# Patient Record
Sex: FEMALE | Race: WHITE | NOT HISPANIC OR LATINO | Employment: STUDENT | URBAN - METROPOLITAN AREA
[De-identification: names, ages, dates, MRNs, and addresses within clinical notes are randomized per-mention and may not be internally consistent; named-entity substitution may affect disease eponyms.]

---

## 2017-02-01 ENCOUNTER — TELEPHONE (OUTPATIENT)
Dept: NEUROLOGY | Age: 22
End: 2017-02-01

## 2017-02-01 RX ORDER — TOPIRAMATE 25 MG/1
25 TABLET ORAL NIGHTLY
Status: SHIPPED | COMMUNITY
Start: 2017-02-01 | End: 2017-06-02 | Stop reason: SDUPTHER

## 2017-02-02 ENCOUNTER — OFF PREMISE (OUTPATIENT)
Dept: OTHER | Age: 22
End: 2017-02-02

## 2017-02-03 RX ORDER — MECLIZINE HCL 12.5 MG/1
12.5 TABLET ORAL 3 TIMES DAILY PRN
Status: SHIPPED | COMMUNITY
Start: 2017-02-03 | End: 2017-06-02 | Stop reason: CLARIF

## 2017-06-02 ENCOUNTER — TRANSFERRED RECORDS (OUTPATIENT)
Dept: HEALTH INFORMATION MANAGEMENT | Facility: CLINIC | Age: 22
End: 2017-06-02

## 2017-06-02 ENCOUNTER — OFFICE VISIT (OUTPATIENT)
Dept: NEUROLOGY | Age: 22
End: 2017-06-02

## 2017-06-02 VITALS — SYSTOLIC BLOOD PRESSURE: 98 MMHG | DIASTOLIC BLOOD PRESSURE: 70 MMHG | HEART RATE: 80 BPM | WEIGHT: 122 LBS

## 2017-06-02 DIAGNOSIS — G43.109 MIGRAINE WITH AURA AND WITHOUT STATUS MIGRAINOSUS, NOT INTRACTABLE: Primary | ICD-10-CM

## 2017-06-02 PROCEDURE — 99213 OFFICE O/P EST LOW 20 MIN: CPT | Performed by: PSYCHIATRY & NEUROLOGY

## 2017-06-02 RX ORDER — TOPIRAMATE 25 MG/1
25 TABLET ORAL NIGHTLY
Qty: 30 TABLET | Refills: 11 | Status: SHIPPED | OUTPATIENT
Start: 2017-06-02 | End: 2018-06-17 | Stop reason: SDUPTHER

## 2017-07-12 ENCOUNTER — OFFICE VISIT (OUTPATIENT)
Dept: PEDIATRICS | Age: 22
End: 2017-07-12

## 2017-07-12 ENCOUNTER — TRANSFERRED RECORDS (OUTPATIENT)
Dept: HEALTH INFORMATION MANAGEMENT | Facility: CLINIC | Age: 22
End: 2017-07-12

## 2017-07-12 ENCOUNTER — LAB SERVICES (OUTPATIENT)
Dept: LAB | Age: 22
End: 2017-07-12

## 2017-07-12 VITALS — WEIGHT: 123 LBS

## 2017-07-12 DIAGNOSIS — J01.90 ACUTE SINUSITIS, UNSPECIFIED: Primary | ICD-10-CM

## 2017-07-12 DIAGNOSIS — Z13.220 LIPID SCREENING: ICD-10-CM

## 2017-07-12 PROCEDURE — 99213 OFFICE O/P EST LOW 20 MIN: CPT | Performed by: PEDIATRICS

## 2017-07-12 PROCEDURE — 80061 LIPID PANEL: CPT | Performed by: INTERNAL MEDICINE

## 2017-07-12 PROCEDURE — 36415 COLL VENOUS BLD VENIPUNCTURE: CPT | Performed by: INTERNAL MEDICINE

## 2017-07-12 RX ORDER — AMOXICILLIN AND CLAVULANATE POTASSIUM 875; 125 MG/1; MG/1
1 TABLET, FILM COATED ORAL EVERY 12 HOURS
Qty: 20 TABLET | Refills: 0 | Status: SHIPPED | OUTPATIENT
Start: 2017-07-12 | End: 2017-07-22

## 2017-07-13 LAB
CHOLEST SERPL-MCNC: 231 MG/DL
CHOLEST/HDLC SERPL: 4.4 {RATIO}
HDLC SERPL-MCNC: 52 MG/DL
LDLC SERPL-MCNC: 137 MG/DL
LENGTH OF FAST TIME PATIENT: 12 HRS
NONHDLC SERPL-MCNC: 179 MG/DL
TRIGL SERPL-MCNC: 208 MG/DL

## 2017-07-17 ENCOUNTER — TELEPHONE (OUTPATIENT)
Dept: PEDIATRICS | Age: 22
End: 2017-07-17

## 2017-08-11 ENCOUNTER — OFFICE VISIT (OUTPATIENT)
Dept: URGENT CARE | Facility: URGENT CARE | Age: 22
End: 2017-08-11
Payer: COMMERCIAL

## 2017-08-11 VITALS
DIASTOLIC BLOOD PRESSURE: 72 MMHG | SYSTOLIC BLOOD PRESSURE: 108 MMHG | HEART RATE: 90 BPM | WEIGHT: 129 LBS | OXYGEN SATURATION: 96 % | TEMPERATURE: 98.7 F

## 2017-08-11 DIAGNOSIS — L03.011 CELLULITIS OF RIGHT INDEX FINGER: Primary | ICD-10-CM

## 2017-08-11 PROCEDURE — 99203 OFFICE O/P NEW LOW 30 MIN: CPT | Performed by: INTERNAL MEDICINE

## 2017-08-11 ASSESSMENT — ENCOUNTER SYMPTOMS
SENSORY CHANGE: 0
FEVER: 0
CHILLS: 0

## 2017-08-11 NOTE — PATIENT INSTRUCTIONS
Call doctor if your finger swelling, redness, pain persist/worsens, or if you develop new symptoms or side effects from the medication.

## 2017-08-11 NOTE — MR AVS SNAPSHOT
"              After Visit Summary   8/11/2017    Carito Varela    MRN: [id number]           Patient Information     Date Of Birth          1995        Visit Information        Provider Department      8/11/2017 5:00 PM Moi Koroma MD [facility]        Today's Diagnoses     Cellulitis of right index finger    -  1      Care Instructions    Call doctor if your finger swelling, redness, pain persist/worsens, or if you develop new symptoms or side effects from the medication.            Follow-ups after your visit        Who to contact     If you have questions or need follow up information about today's clinic visit or your schedule please contact [facility] directly at [phone].  Normal or non-critical lab and imaging results will be communicated to you by MyChart, letter or phone within 4 business days after the clinic has received the results. If you do not hear from us within 7 days, please contact the clinic through MyChart or phone. If you have a critical or abnormal lab result, we will notify you by phone as soon as possible.  Submit refill requests through [employer] or call your pharmacy and they will forward the refill request to us. Please allow 3 business days for your refill to be completed.          Additional Information About Your Visit        MyChart Information     [employer] lets you send messages to your doctor, view your test results, renew your prescriptions, schedule appointments and more. To sign up, go to [email or web address] . Click on \"Log in\" on the left side of the screen, which will take you to the Welcome page. Then click on \"Sign up Now\" on the right side of the page.     You will be asked to enter the access code listed below, as well as some personal information. Please follow the directions to create your username and password.     Your access code is: [email or web address]  Expires: 11/9/2017  5:27 PM     Your access " code will  in 90 days. If you need help or a new code, please call your [place] clinic or [phone].        Care EveryWhere ID     This is your Care EveryWhere ID. This could be used by other organizations to access your [place] medical records  [id number]        Your Vitals Were     Pulse Temperature Pulse Oximetry             90 98.7  F (37.1  C) (Oral) 96%          Blood Pressure from Last 3 Encounters:   17 108/72    Weight from Last 3 Encounters:   17 129 lb (58.5 kg)              Today, you had the following     No orders found for display         Today's Medication Changes          These changes are accurate as of: 17  5:27 PM.  If you have any questions, ask your nurse or doctor.               Start taking these medicines.        Dose/Directions    amoxicillin-clavulanate 875-125 MG per tablet   Commonly known as:  AUGMENTIN   Used for:  Cellulitis of right index finger   Started by:  Moi Koroma MD        Dose:  1 tablet   Take 1 tablet by mouth 2 times daily   Quantity:  20 tablet   Refills:  0            Where to get your medicines      These medications were sent to [facility], [place] - [address]  [address], [address], [facility] [address]     Phone:  [phone]     amoxicillin-clavulanate 875-125 MG per tablet                Primary Care Provider    None Specified       No primary provider on file.        Equal Access to Services     [facility] : Hadii za aliceao Sotanika, waaxda luqadaha, qaybta kaalmada adeblakeda, danette daniel . So [facility] [phone].    ATENCIÓN: Si habla español, tiene a cardona disposición servicios gratuitos de asistencia lingüística. Dave al [phone].    We comply with applicable federal civil rights laws and [place] laws. We do not discriminate on the basis of race, color, national origin, age, disability sex, sexual orientation or gender  identity.            Thank you!     Thank you for choosing [facility]  for your care. Our goal is always to provide you with excellent care. Hearing back from our patients is one way we can continue to improve our services. Please take a few minutes to complete the written survey that you may receive in the mail after your visit with us. Thank you!             Your Updated Medication List - Protect others around you: Learn how to safely use, store and throw away your medicines at www.disposemymeds.org.          This list is accurate as of: 8/11/17  5:27 PM.  Always use your most recent med list.                   Brand Name Dispense Instructions for use Diagnosis    amoxicillin-clavulanate 875-125 MG per tablet    AUGMENTIN    20 tablet    Take 1 tablet by mouth 2 times daily    Cellulitis of right index finger       norethindrone-ethinyl estradiol 0.5/0.75/1-35 MG-MCG per tablet    ORTHO-NOVUM 7/7/7     Take 1 tablet by mouth daily        SPIRONOLACTONE PO           TOPAMAX PO

## 2017-08-11 NOTE — NURSING NOTE
Chief Complaint   Patient presents with     Urgent Care     Finger     right index finger infected? Vomiting nausea on wednesday and thursday       Initial /72  Pulse 90  Temp 98.7  F (37.1  C) (Oral)  Wt 129 lb (58.5 kg)  SpO2 96% There is no height or weight on file to calculate BMI.  Medication Reconciliation: complete    Tracey MAYS MA

## 2017-08-12 NOTE — PROGRESS NOTES
HPI Comments:   Problem: derm problem    Character - tender erythematous swelling  Location - tip of right index finger  Intensity - pain tolerable  Timing/Onset - 1 week  Aggravating Factors - pain aggravated by palpation/pressure  Alleviating Factors - no OTC remedies tried  Associated Symptoms - no fever/chills      Past medical history: no history of recurrent or resistant skin infections      Review of Systems   Constitutional: Negative for chills and fever.   Neurological: Negative for sensory change.       /72  Pulse 90  Temp 98.7  F (37.1  C) (Oral)  Wt 129 lb (58.5 kg)  SpO2 96%      Physical Exam   Constitutional: She is oriented to person, place, and time. No distress.   Musculoskeletal: Normal range of motion. She exhibits tenderness ( tip of right index finger). She exhibits no edema.   Neurological: She is alert and oriented to person, place, and time. GCS score is 15.   Skin: There is erythema ( tip of right index finger).   No abscess   Vitals reviewed.        ICD-10-CM    1. Cellulitis of right index finger L03.011 amoxicillin-clavulanate (AUGMENTIN) 875-125 MG per tablet     **please refer to HPI for status of conditions      Patient Instructions   Call doctor if your finger swelling, redness, pain persist/worsens, or if you develop new symptoms or side effects from the medication.

## 2017-09-07 ENCOUNTER — OFFICE VISIT (OUTPATIENT)
Dept: URGENT CARE | Facility: URGENT CARE | Age: 22
End: 2017-09-07
Payer: COMMERCIAL

## 2017-09-07 VITALS
TEMPERATURE: 99 F | DIASTOLIC BLOOD PRESSURE: 72 MMHG | SYSTOLIC BLOOD PRESSURE: 116 MMHG | HEART RATE: 70 BPM | OXYGEN SATURATION: 100 %

## 2017-09-07 DIAGNOSIS — L73.9 FOLLICULITIS: Primary | ICD-10-CM

## 2017-09-07 PROCEDURE — 99213 OFFICE O/P EST LOW 20 MIN: CPT | Performed by: INTERNAL MEDICINE

## 2017-09-07 RX ORDER — CEPHALEXIN 500 MG/1
500 CAPSULE ORAL 3 TIMES DAILY
Qty: 30 CAPSULE | Refills: 0 | Status: SHIPPED | OUTPATIENT
Start: 2017-09-07 | End: 2018-07-27

## 2017-09-07 RX ORDER — MUPIROCIN 20 MG/G
OINTMENT TOPICAL 3 TIMES DAILY
Qty: 22 G | Refills: 1 | Status: SHIPPED | OUTPATIENT
Start: 2017-09-07 | End: 2017-09-12

## 2017-09-07 NOTE — MR AVS SNAPSHOT
"              After Visit Summary   2017    Carito Varela    MRN: [id number]           Patient Information     Date Of Birth          1995        Visit Information        Provider Department      2017 6:50 PM Susanne Jimenez MD [facility]        Today's Diagnoses     Folliculitis    -  1       Follow-ups after your visit        Who to contact     If you have questions or need follow up information about today's clinic visit or your schedule please contact [facility] directly at [phone].  Normal or non-critical lab and imaging results will be communicated to you by [employer], letter or phone within 4 business days after the clinic has received the results. If you do not hear from us within 7 days, please contact the clinic through [employer] or phone. If you have a critical or abnormal lab result, we will notify you by phone as soon as possible.  Submit refill requests through [employer] or call your pharmacy and they will forward the refill request to us. Please allow 3 business days for your refill to be completed.          Additional Information About Your Visit        MyChart Information     [employer] lets you send messages to your doctor, view your test results, renew your prescriptions, schedule appointments and more. To sign up, go to [email or web address] . Click on \"Log in\" on the left side of the screen, which will take you to the Welcome page. Then click on \"Sign up Now\" on the right side of the page.     You will be asked to enter the access code listed below, as well as some personal information. Please follow the directions to create your username and password.     Your access code is: [email or web address]  Expires: 2017  5:27 PM     Your access code will  in 90 days. If you need help or a new code, please call your [facility] or [phone].        Care EveryWhere ID     This is your Care EveryWhere ID. This could be used by other " organizations to access your [place] medical records  [id number]        Your Vitals Were     Pulse Temperature Pulse Oximetry Breastfeeding?          70 99  F (37.2  C) (Oral) 100% No         Blood Pressure from Last 3 Encounters:   09/07/17 116/72   08/11/17 108/72    Weight from Last 3 Encounters:   08/11/17 129 lb (58.5 kg)              Today, you had the following     No orders found for display         Today's Medication Changes          These changes are accurate as of: 9/7/17  9:00 PM.  If you have any questions, ask your nurse or doctor.               Start taking these medicines.        Dose/Directions    cephALEXin 500 MG capsule   Commonly known as:  KEFLEX   Used for:  Folliculitis        Dose:  500 mg   Take 1 capsule (500 mg) by mouth 3 times daily   Quantity:  30 capsule   Refills:  0       mupirocin 2 % ointment   Commonly known as:  BACTROBAN   Used for:  Folliculitis        Apply topically 3 times daily for 5 days   Quantity:  22 g   Refills:  1            Where to get your medicines      These medications were sent to [facility] [address] [place] [address] AT [address] [address]    Hours:  24-hours Phone:  [phone]     cephALEXin 500 MG capsule    mupirocin 2 % ointment                Primary Care Provider    None Specified       No primary provider on file.        Equal Access to Services     DARRELL KNIGHT : Laury haas Sotanika, waaxda luqadaha, qaybta kaalmada adeegyada, danette chaves. So [facility] [phone].    ATENCIÓN: Si habla español, tiene a cardona disposición servicios gratuitos de asistencia lingüística. Llame al [phone].    We comply with applicable federal civil rights laws and [place] laws. We do not discriminate on the basis of race, color, national origin, age, disability sex, sexual orientation or gender identity.            Thank you!     Thank you for choosing [facility]  for your care. Our goal is always to provide you with excellent care. Hearing back from our patients is one way we can continue to improve our services. Please take a few minutes to complete the written survey that you may receive in the mail after your visit with us. Thank you!             Your Updated Medication List - Protect others around you: Learn how to safely use, store and throw away your medicines at www.disposemymeds.org.          This list is accurate as of: 9/7/17  9:00 PM.  Always use your most recent med list.                   Brand Name Dispense Instructions for use Diagnosis    amoxicillin-clavulanate 875-125 MG per tablet    AUGMENTIN    20 tablet    Take 1 tablet by mouth 2 times daily    Cellulitis of right index finger       cephALEXin 500 MG capsule    KEFLEX    30 capsule    Take 1 capsule (500 mg) by mouth 3 times daily    Folliculitis       mupirocin 2 % ointment    BACTROBAN    22 g    Apply topically 3 times daily for 5 days    Folliculitis       norethindrone-ethinyl estradiol 0.5/0.75/1-35 MG-MCG per tablet    ORTHO-NOVUM 7/7/7     Take 1 tablet by mouth daily        SPIRONOLACTONE PO           TOPAMAX PO

## 2017-09-08 NOTE — PROGRESS NOTES
[facility] Progress Note        Susanne Jimenez MD, MPH  09/07/2017    Carito Varela is a pleasant 22 year old female seen for a non-pruritic rash involving forehead x 2 weeks.  There has not been any change in the diet or new detergent, soap or toiletries.  No new medications, no insect bite.  No fever or chills and no recent illness.  No cough or shortness of breath or wheezing is referred.  No nausea, vomiting or diarrhea.  No arthralgia or myalgia.  No tongue or lip swelling or swallowing problems.    Physical Exam   /72 (BP Location: Right arm, Cuff Size: Adult Regular)  Pulse 70  Temp 99  F (37.2  C) (Oral)  SpO2 100%  Breastfeeding? No     Constitutional: Patient is in no distress The patient is pleasant and cooperative.   HEENT: Head:  Head is atraumatic, normocephalic.    Eyes: Pupils are equal, round and reactive to light and accomodation.  Sclera is non-icteric. No conjunctival injection, or exudate noted. Extraocular motion is intact. Visual acuity is intact bilaterally.  Ears:  External acoustic canals are patent and clear.  There is no erythema and bulging( exudate)  of the ( R/L ) tympanic membrane(s ).   Nose:  No Nasal congestion w/o drainage or mucosal ulceration is noted.  Throat:  Oral mucosa is moist.  No oral lesions are noted.  No posterior pharyngeal hyperemia or exudate noted.     Neck Supple.  There is no cervical lymphadenopathy.  No nuchal rigidity noted.  There is no meningismus.     Cardiovascular: Heart is regular to rate and rhythm.  No murmur is noted.     Chest. Chest Symmetrical, no soft tissues, swelling, or tenderness upon palpation   Lungs: Clear in the anterior and posterior pulmonary fields.   Abdomen: Soft and non-tender.    Back No flank tenderness is noted.   Extremeties No edema, no calf tenderness.   Neuro: No focal deficit.   Skin No petechiae or purpura is noted.  There is are perifollicular papular rash w/o vesicles or ulceration of the  forehead. No cellulitis is noted. No crusty lesions.   Mood Normal         Assessment & Plan     Folliculitis ( forehead);    - mupirocin (BACTROBAN) 2 % ointment; Apply topically 3 times daily for 5 days  Dispense: 22 g; Refill: 1  - cephALEXin (KEFLEX) 500 MG capsule; Take 1 capsule (500 mg) by mouth 3 times daily  Dispense: 30 capsule; Refill: 0   advised to wash the skin of forehead w an antibacterial soap and water daily.  Follow-up with your PCP in 3-4 days days, earlier if symptoms worsen.

## 2017-09-08 NOTE — NURSING NOTE
Chief Complaint   Patient presents with     Urgent Care     Derm Problem     Rash on forehead that started two weeks ago.        Initial /72 (BP Location: Right arm, Cuff Size: Adult Regular)  Pulse 70  Temp 99  F (37.2  C) (Oral)  SpO2 100%  Breastfeeding? No There is no height or weight on file to calculate BMI.  Medication Reconciliation: complete.  ANGÉLICA Blackmon

## 2018-06-21 RX ORDER — TOPIRAMATE 25 MG/1
TABLET ORAL
Qty: 30 TABLET | Refills: 1 | Status: SHIPPED | OUTPATIENT
Start: 2018-06-21

## 2018-06-22 ENCOUNTER — TELEPHONE (OUTPATIENT)
Dept: NEUROLOGY | Age: 23
End: 2018-06-22

## 2018-07-24 NOTE — TELEPHONE ENCOUNTER
FUTURE VISIT INFORMATION      FUTURE VISIT INFORMATION:    Date: 07/27/2018    Time:     Location:   REFERRAL INFORMATION:    Referring provider:  System, Provider Not In Dr. Mcclendon     Referring providers clinic:      Reason for visit/diagnosis      RECORDS REQUESTED FROM:       Clinic name Comments Records Status Imaging Status   [facility] MRI BRAIN WO CONTRAST 12/30/2014  Requested on 07/23/2018  In-coming  In-Coming                                    RECORDS STATUS      Internal Records [facility], Care Everywhere.

## 2018-07-27 ENCOUNTER — PRE VISIT (OUTPATIENT)
Dept: NEUROLOGY | Facility: CLINIC | Age: 23
End: 2018-07-27

## 2018-07-27 ENCOUNTER — OFFICE VISIT (OUTPATIENT)
Dept: NEUROLOGY | Facility: CLINIC | Age: 23
End: 2018-07-27
Payer: COMMERCIAL

## 2018-07-27 VITALS
WEIGHT: 128.7 LBS | SYSTOLIC BLOOD PRESSURE: 104 MMHG | DIASTOLIC BLOOD PRESSURE: 66 MMHG | BODY MASS INDEX: 20.68 KG/M2 | HEART RATE: 75 BPM | HEIGHT: 66 IN

## 2018-07-27 DIAGNOSIS — G43.009 MIGRAINE WITHOUT AURA AND WITHOUT STATUS MIGRAINOSUS, NOT INTRACTABLE: Primary | ICD-10-CM

## 2018-07-27 RX ORDER — TOPIRAMATE 25 MG/1
25 TABLET, FILM COATED ORAL 2 TIMES DAILY
Qty: 60 TABLET | Refills: 6 | Status: SHIPPED | OUTPATIENT
Start: 2018-07-27 | End: 2018-10-09

## 2018-07-27 ASSESSMENT — PAIN SCALES - GENERAL: PAINLEVEL: NO PAIN (0)

## 2018-07-27 NOTE — PROGRESS NOTES
"Service Date: 07/27/2018      REASON FOR VISIT:   Carito Varela is a 23-year-old female here to establish care for migraine headaches.      HISTORY OF PRESENT ILLNESS:   She has had migraines since childhood.  Her typical headache is left-sided, generally behind the left eye.  With the more severe headaches, she can have nausea and vomiting.  She is photophobic.  She may get tunnel vision during the headaches but otherwise has had no other complex neurologic symptoms.  She does not really recall an aura to her headaches.      She did have [facility] that was reported as \"unremarkable brain.\"      In the past, she was tried on nortriptyline, but developed diaphoresis and was switched to Topamax.  On a dose of 50 mg a day, it brought her headaches under very good control.  About a year after she started Topamax she had a few weeks of dizziness.  She is unclear if it really related to the Topamax or not, but at that point, her dose was reduced to 25 mg a day.      She reports mild headaches 3 times a week that are similar to her migraines, but less severe.  She tries to take nothing for those.  She can have a severe headache 2-4 times a month and she will take Excedrin for those with good relief.      She has not experienced any side effects from the Topamax.  She is on an oral contraceptive and is not planning pregnancy.  She has no history of kidney stones.  She has her eyes checked periodically and has not been told she had glaucoma.      Her past history is otherwise notable, by her report, for high cholesterol for which she is not on any specific treatment.      CURRENT MEDICATIONS:   1.  Topamax 25 mg a day.     2.  Spironolactone for skin.     3.  Oral contraceptive.     4.  Coenzyme Q.      ALLERGIES:  She is allergic to Zofran and Macrobid.      FAMILY HISTORY:  Strongly positive for migraine including her mother, sister, paternal aunt, and both her " grandmothers.      SOCIAL HISTORY:  She works for an [employer] agency.  She does not smoke.  She is a social user of alcohol.      PHYSICAL EXAMINATION:     GENERAL:   Reveals a thin female.  She is alert and cooperative.     VITAL SIGNS:   Heart rate 75.  Blood pressure 104/66.     NEUROLOGIC:   Funduscopic examination reveals sharp disc margins.  Pupils are equal, round and react well to light.  Visual fields are intact.  Cranial nerves II-XII are intact.  Motor, sensory, cerebellar and gait testing are normal.  Reflexes are 2+.  Plantar responses are flexor.      IMPRESSION:  Migraine without aura.      PLAN:  We discussed increasing her Topamax dose back up to 50 mg a day.  It is unclear if the dizziness she experienced on this dose was really related to the Topamax and she would like to try this.      We discussed the potential side effects of Topamax including pregnancy issue, kidney stones and glaucoma.  I also discussed weight loss which she tells me has not been an issue for her in the past.      She can continue to use Excedrin for abortive therapy as she is using it infrequently.      She will contact me if she has any difficulty with the increased dose of Topamax.  Otherwise, I will plan to see her back in 6 months.      MD AGAPITO Wallace MD             D: 2018   T: 2018   MT: KAYLA      Name:     JOHN ARIZMENDI   MRN:      [id number]        Account:      [id number]   :      1995           Service Date: 2018      Document: [id number]

## 2018-07-27 NOTE — MR AVS SNAPSHOT
"              After Visit Summary   7/27/2018    Carito Varela    MRN: [id number]           Patient Information     Date Of Birth          1995        Visit Information        Provider Department      7/27/2018 7:00 AM Avery Sterling MD [facility] Neurology        Today's Diagnoses     Migraine without aura and without status migrainosus, not intractable    -  1       Follow-ups after your visit        Follow-up notes from your care team     Discussed this visit Return in about 6 months (around 1/27/2019).      Who to contact     Please call your clinic at [phone] to:    Ask questions about your health    Make or cancel appointments    Discuss your medicines    Learn about your test results    Speak to your doctor            Additional Information About Your Visit        Access [employer] Information     [employer] gives you secure access to your electronic health record. If you see a primary care provider, you can also send messages to your care team and make appointments. If you have questions, please call your primary care clinic.  If you do not have a primary care provider, please call [phone] and they will assist you.      [employer] is an electronic gateway that provides easy, online access to your medical records. With [employer], you can request a clinic appointment, read your test results, renew a prescription or communicate with your care team.     To access your existing account, please contact your [facility] or call [phone] for assistance.        Care EveryWhere ID     This is your Care EveryWhere ID. This could be used by other organizations to access your [facility] medical records  [id number]        Your Vitals Were     Pulse Height BMI (Body Mass Index)             75 1.676 m (5' 6\") 20.77 kg/m2          Blood Pressure from Last 3 Encounters:   07/27/18 104/66   09/07/17 116/72   08/11/17 108/72    Weight from Last 3 Encounters:   07/27/18 58.4 kg (128 lb 11.2 oz) "   08/11/17 58.5 kg (129 lb)              Today, you had the following     No orders found for display         Today's Medication Changes          These changes are accurate as of 7/27/18  7:15 AM.  If you have any questions, ask your nurse or doctor.               These medicines have changed or have updated prescriptions.        Dose/Directions    * TOPAMAX PO   This may have changed:  Another medication with the same name was added. Make sure you understand how and when to take each.   Changed by:  Avery Sterling MD        Refills:  0       * topiramate 25 MG tablet   Commonly known as:  TOPAMAX   This may have changed:  You were already taking a medication with the same name, and this prescription was added. Make sure you understand how and when to take each.   Used for:  Migraine without aura and without status migrainosus, not intractable   Changed by:  Avery Sterling MD        Dose:  25 mg   Take 1 tablet (25 mg) by mouth 2 times daily   Quantity:  60 tablet   Refills:  6       * Notice:  This list has 2 medication(s) that are the same as other medications prescribed for you. Read the directions carefully, and ask your doctor or other care provider to review them with you.         Where to get your medicines      These medications were sent to [facility] [address] [place] [address] AT [address] [address]    Hours:  24-hours Phone:  [phone]     topiramate 25 MG tablet                Primary Care Provider Office Phone # Fax #    Sara Pittman Mibe [phone]       [facility] [address] [address] [facility] [address]        Equal Access to Services     MILO KNIGHT AH: Hadsavage haas Sotanika, waaxda luqadaha, qaybta kaalmada aderadha, danette chaves. So [facility] [phone].    ATENCIÓN: Si habla español, tiene a cardona disposición servicios gratuitos de asistencia lingüística. Llame al  [phone].    We comply with applicable federal civil rights laws and [place] laws. We do not discriminate on the basis of race, color, national origin, age, disability, sex, sexual orientation, or gender identity.            Thank you!     Thank you for choosing [facility] NEUROLOGY  for your care. Our goal is always to provide you with excellent care. Hearing back from our patients is one way we can continue to improve our services. Please take a few minutes to complete the written survey that you may receive in the mail after your visit with us. Thank you!             Your Updated Medication List - Protect others around you: Learn how to safely use, store and throw away your medicines at www.disposemymeds.org.          This list is accurate as of 7/27/18  7:15 AM.  Always use your most recent med list.                   Brand Name Dispense Instructions for use Diagnosis    COENZYME Q-10 PO      Take 300 mg by mouth daily        norethindrone-ethinyl estradiol 0.5/0.75/1-35 MG-MCG per tablet    ORTHO-NOVUM 7/7/7     Take 1 tablet by mouth daily        SPIRONOLACTONE PO           * TOPAMAX PO           * topiramate 25 MG tablet    TOPAMAX    60 tablet    Take 1 tablet (25 mg) by mouth 2 times daily    Migraine without aura and without status migrainosus, not intractable       * Notice:  This list has 2 medication(s) that are the same as other medications prescribed for you. Read the directions carefully, and ask your doctor or other care provider to review them with you.

## 2018-07-27 NOTE — LETTER
"7/27/2018       RE: Carito Varela  [address]  [facility] [address]     Dear Colleague,    Thank you for referring your patient, Carito Varela, to the [facility] NEUROLOGY at [facility]. Please see a copy of my visit note below.    Service Date: 07/27/2018      REASON FOR VISIT:   Carito Varela is a 23-year-old female here to establish care for migraine headaches.      HISTORY OF PRESENT ILLNESS:   She has had migraines since childhood.  Her typical headache is left-sided, generally behind the left eye.  With the more severe headaches, she can have nausea and vomiting.  She is photophobic.  She may get tunnel vision during the headaches but otherwise has had no other complex neurologic symptoms.  She does not really recall an aura to her headaches.      She did have a brain MRI scan done through the [facility] on 12/30/2014 that was reported as \"unremarkable brain.\"      In the past, she was tried on nortriptyline, but developed diaphoresis and was switched to Topamax.  On a dose of 50 mg a day, it brought her headaches under very good control.  About a year after she started Topamax she had a few weeks of dizziness.  She is unclear if it really related to the Topamax or not, but at that point, her dose was reduced to 25 mg a day.      She reports mild headaches 3 times a week that are similar to her migraines, but less severe.  She tries to take nothing for those.  She can have a severe headache 2-4 times a month and she will take Excedrin for those with good relief.      She has not experienced any side effects from the Topamax.  She is on an oral contraceptive and is not planning pregnancy.  She has no history of kidney stones.  She has her eyes checked periodically and has not been told she had glaucoma.      Her past history is otherwise notable, by her report, for high cholesterol for which she is not on any specific treatment.      CURRENT " MEDICATIONS:   1.  Topamax 25 mg a day.     2.  Spironolactone for skin.     3.  Oral contraceptive.     4.  Coenzyme Q.      ALLERGIES:  She is allergic to Zofran and Macrobid.      FAMILY HISTORY:  Strongly positive for migraine including her mother, sister, paternal aunt, and both her grandmothers.      SOCIAL HISTORY:  She works for an [employer] agency.  She does not smoke.  She is a social user of alcohol.      PHYSICAL EXAMINATION:     GENERAL:   Reveals a thin female.  She is alert and cooperative.     VITAL SIGNS:   Heart rate 75.  Blood pressure 104/66.     NEUROLOGIC:   Funduscopic examination reveals sharp disc margins.  Pupils are equal, round and react well to light.  Visual fields are intact.  Cranial nerves II-XII are intact.  Motor, sensory, cerebellar and gait testing are normal.  Reflexes are 2+.  Plantar responses are flexor.      IMPRESSION:  Migraine without aura.      PLAN:  We discussed increasing her Topamax dose back up to 50 mg a day.  It is unclear if the dizziness she experienced on this dose was really related to the Topamax and she would like to try this.      We discussed the potential side effects of Topamax including pregnancy issue, kidney stones and glaucoma.  I also discussed weight loss which she tells me has not been an issue for her in the past.      She can continue to use Excedrin for abortive therapy as she is using it infrequently.      She will contact me if she has any difficulty with the increased dose of Topamax.  Otherwise, I will plan to see her back in 6 months.      Avery Sterling MD              D: 2018   T: 2018   MT: KAYLA      Name:     JOHN ARIZMENDI   MRN:      [id number]        Account:      [id number]   :      1995           Service Date: 2018      Document: [id number]

## 2018-07-28 ENCOUNTER — HEALTH MAINTENANCE LETTER (OUTPATIENT)
Age: 23
End: 2018-07-28

## 2018-10-09 DIAGNOSIS — G43.009 MIGRAINE WITHOUT AURA AND WITHOUT STATUS MIGRAINOSUS, NOT INTRACTABLE: ICD-10-CM

## 2018-10-10 RX ORDER — TOPIRAMATE 25 MG/1
25 TABLET, FILM COATED ORAL 2 TIMES DAILY
Qty: 180 TABLET | Refills: 3 | Status: SHIPPED | OUTPATIENT
Start: 2018-10-10 | End: 2019-01-16

## 2019-01-01 ENCOUNTER — EXTERNAL RECORD (OUTPATIENT)
Dept: OTHER | Age: 24
End: 2019-01-01

## 2019-01-16 ENCOUNTER — OFFICE VISIT (OUTPATIENT)
Dept: NEUROLOGY | Facility: CLINIC | Age: 24
End: 2019-01-16
Payer: COMMERCIAL

## 2019-01-16 VITALS
HEART RATE: 89 BPM | BODY MASS INDEX: 19.93 KG/M2 | OXYGEN SATURATION: 100 % | WEIGHT: 123.5 LBS | SYSTOLIC BLOOD PRESSURE: 113 MMHG | DIASTOLIC BLOOD PRESSURE: 71 MMHG

## 2019-01-16 DIAGNOSIS — G43.009 MIGRAINE WITHOUT AURA AND WITHOUT STATUS MIGRAINOSUS, NOT INTRACTABLE: ICD-10-CM

## 2019-01-16 DIAGNOSIS — G43.709 CHRONIC MIGRAINE WITHOUT AURA WITHOUT STATUS MIGRAINOSUS, NOT INTRACTABLE: Primary | ICD-10-CM

## 2019-01-16 PROCEDURE — 99213 OFFICE O/P EST LOW 20 MIN: CPT | Performed by: PSYCHIATRY & NEUROLOGY

## 2019-01-16 RX ORDER — CHOLECALCIFEROL (VITAMIN D3) 50 MCG
1 TABLET ORAL DAILY
COMMUNITY

## 2019-01-16 RX ORDER — TOPIRAMATE 25 MG/1
TABLET, FILM COATED ORAL
Qty: 180 TABLET | Refills: 3 | Status: SHIPPED | OUTPATIENT
Start: 2019-01-16 | End: 2019-03-22

## 2019-01-16 ASSESSMENT — PAIN SCALES - GENERAL: PAINLEVEL: NO PAIN (0)

## 2019-01-16 NOTE — NURSING NOTE
Carito Varela's goals for this visit include: return  She requests these members of her care team be copied on today's visit information:     PCP: Sara Wallace    Referring Provider:  Sara Wallace MD  [facility]  [address] [place] [address]    /71   Pulse 89   Wt 56 kg (123 lb 8 oz)   SpO2 100%   BMI 19.93 kg/m      Do you need any medication refills at today's visit? y

## 2019-01-16 NOTE — LETTER
1/16/2019         RE: Carito Varela  [address] [facility] [address]  [place] [address]        Dear Colleague,    Thank you for referring your patient, Carito Varela, to the [facility]. Please see a copy of my visit note below.    Visit Date:   01/16/2019      HISTORY OF PRESENT ILLNESS:  Carito Varela returns for followup of chronic migraine.  She has had migraines since her childhood.      She had an unremarkable brain MRI scan done in 12/2014.      When I saw her in July, it was decided to try and increase her Topamax to 50 mg a day.  She did not experience any side effects on the higher dose, but her dizziness did not get any worse.  She does report that occasionally her menstrual period will come a bit earlier, but she is not having any significant breakthrough bleeding throughout her cycle.  Overall, she has been tolerating this higher dose of Topamax.  For a while, she did quite well with the reduction in her headache frequency, but then in November she began experiencing daily left-sided headaches typical of her migraine, also some twitching of her left eyelid.  She cannot identify any specific factors that may have provoked the increase in her migraine frequency, but they did melinda last week.      CURRENT MEDICATIONS:  Excedrin, Ortho-Novum, Topamax 25 mg twice a day, Vitamin D, and spironolactone.      She did not tolerate nortriptyline due to diaphoresis.      PHYSICAL EXAMINATION:  Reveals her heart rate is 89.  Blood pressure 113/71.  Weight of 123.5 pounds.      Pupils are equal, round, and react well to light.  Funduscopic examination reveals sharp disc margins.  She has full extraocular motility.  There are no abnormal facial movements appreciated.  She has normal facial strength and sensation.  Otherwise, cranial nerves II-XII are intact.  Motor, sensory, cerebellar, and gait testing are normal.  Reflexes are 2-3+ and symmetric.  Plantar responses are flexor.      ASSESSMENT:   Migraine.      PLAN:  She is going to push her Topamax dose up to 75 mg.  Depending on her response and side effects, she has the latitude to go to 100 mg a day in divided doses after a week.      I am going to see her back in February.  Depending on how she does with Topamax, I may have to consider other treatment options, and venlafaxine might be considered at that time.         AVERY STERLING MD             D: 2019   T: 2019   MT: BRIGID      Name:     JOHN ARIZMENDI   MRN:      [id number]        Account:      [id number]   :      1995           Visit Date:   2019      Document: [id number]        Again, thank you for allowing me to participate in the care of your patient.        Sincerely,        Avery Sterling MD

## 2019-01-17 NOTE — PROGRESS NOTES
Visit Date:   01/16/2019      HISTORY OF PRESENT ILLNESS:  Carito Varela returns for followup of chronic migraine.  She has had migraines since her childhood.      She had an unremarkable brain MRI scan done in 12/2014.      When I saw her in July, it was decided to try and increase her Topamax to 50 mg a day.  She did not experience any side effects on the higher dose, but her dizziness did not get any worse.  She does report that occasionally her menstrual period will come a bit earlier, but she is not having any significant breakthrough bleeding throughout her cycle.  Overall, she has been tolerating this higher dose of Topamax.  For a while, she did quite well with the reduction in her headache frequency, but then in November she began experiencing daily left-sided headaches typical of her migraine, also some twitching of her left eyelid.  She cannot identify any specific factors that may have provoked the increase in her migraine frequency, but they did melinda last week.      CURRENT MEDICATIONS:  Excedrin, Ortho-Novum, Topamax 25 mg twice a day, Vitamin D, and spironolactone.      She did not tolerate nortriptyline due to diaphoresis.      PHYSICAL EXAMINATION:  Reveals her heart rate is 89.  Blood pressure 113/71.  Weight of 123.5 pounds.      Pupils are equal, round, and react well to light.  Funduscopic examination reveals sharp disc margins.  She has full extraocular motility.  There are no abnormal facial movements appreciated.  She has normal facial strength and sensation.  Otherwise, cranial nerves II-XII are intact.  Motor, sensory, cerebellar, and gait testing are normal.  Reflexes are 2-3+ and symmetric.  Plantar responses are flexor.      ASSESSMENT:  Migraine.      PLAN:  She is going to push her Topamax dose up to 75 mg.  Depending on her response and side effects, she has the latitude to go to 100 mg a day in divided doses after a week.      I am going to see her back in February.  Depending on how  she does with Topamax, I may have to consider other treatment options, and venlafaxine might be considered at that time.         AGAPITO LEE MD             D: 2019   T: 2019   MT: BRIGID      Name:     JOHN ARIZMENDI   MRN:      [id number]        Account:      [id number]   :      1995           Visit Date:   2019      Document: [id number]

## 2019-02-22 ENCOUNTER — OFFICE VISIT (OUTPATIENT)
Dept: NEUROLOGY | Facility: CLINIC | Age: 24
End: 2019-02-22
Payer: COMMERCIAL

## 2019-02-22 VITALS — SYSTOLIC BLOOD PRESSURE: 115 MMHG | DIASTOLIC BLOOD PRESSURE: 70 MMHG | OXYGEN SATURATION: 99 % | HEART RATE: 82 BPM

## 2019-02-22 DIAGNOSIS — G43.709 CHRONIC MIGRAINE WITHOUT AURA WITHOUT STATUS MIGRAINOSUS, NOT INTRACTABLE: Primary | ICD-10-CM

## 2019-02-22 DIAGNOSIS — R42 DIZZINESS: ICD-10-CM

## 2019-02-22 RX ORDER — VENLAFAXINE HYDROCHLORIDE 37.5 MG/1
37.5 CAPSULE, EXTENDED RELEASE ORAL DAILY
Qty: 90 CAPSULE | Refills: 3 | Status: SHIPPED | OUTPATIENT
Start: 2019-02-22 | End: 2019-06-28

## 2019-02-22 ASSESSMENT — PAIN SCALES - GENERAL: PAINLEVEL: NO PAIN (0)

## 2019-02-22 NOTE — NURSING NOTE
Chief Complaint   Patient presents with     RECHECK     [facility] RETURN 5 WK FOLLOW UP       Seda Ness, EMT

## 2019-02-22 NOTE — PROGRESS NOTES
Service Date: 2019      John Arizmendi returns for followup of chronic migraine.  I saw her last month.  Her headaches had increased in frequency.  She did increase her Topamax dose to 75 mg.  Her migraine frequency definitely improved to having just 1 headache every other week.  Unfortunately, she developed a number of side effects on the higher dose including increase in her dizziness, nausea, foggy headedness and tingling.      We discussed backing off and possibly eliminating the Topamax depending on how she does.   She is going to reduce the dose to 50 mg but has the latitude to go to 25 mg for a week and then stop the drug if she is still having side effects.      Today I did discuss trying venlafaxine now.  We reviewed  potential side effects.  I am going to go with a low dose of 37.5 mg a day.      I will see her back in a month.         AGAPITO LEE MD             D: 2019   T: 2019   MT: jose      Name:     JOHN ARIZMENDI   MRN:      [id number]        Account:      [id number]   :      1995           Service Date: 2019      Document: [id number]

## 2019-02-22 NOTE — LETTER
RE: John Arizmendi  [address]  [place] [address]     Dear Colleague,    Thank you for referring your patient, John Arizmendi, to the [facility] NEUROLOGY at [facility]. Please see a copy of my visit note below.    Service Date: 2019      John Arizmendi returns for followup of chronic migraine.  I saw her last month.  Her headaches had increased in frequency.  She did increase her Topamax dose to 75 mg.  Her migraine frequency definitely improved to having just 1 headache every other week.  Unfortunately, she developed a number of side effects on the higher dose including increase in her dizziness, nausea, foggy headedness and tingling.      We discussed backing off and possibly eliminating the Topamax depending on how she does.   She is going to reduce the dose to 50 mg but has the latitude to go to 25 mg for a week and then stop the drug if she is still having side effects.      Today I did discuss trying venlafaxine now.  We reviewed  potential side effects.  I am going to go with a low dose of 37.5 mg a day.      I will see her back in a month.         AGAPITO LEE MD        D: 2019   T: 2019   MT: jose    Name:     JOHN ARIZMENDI   MRN:      [id number]        Account:      [id number]   :      1995           Service Date: 2019    Document: [id number]    
05-Jun-2010 11:10:13

## 2019-02-22 NOTE — PATIENT INSTRUCTIONS
Reduce topamax to 50 mg a day for a week. If still dizzy, nausea, etc decrease to 25 mg for a week. If still side effects can then stop topamax    Start venlafaxine 37.5 mg a day

## 2019-03-22 ENCOUNTER — OFFICE VISIT (OUTPATIENT)
Dept: NEUROLOGY | Facility: CLINIC | Age: 24
End: 2019-03-22
Payer: COMMERCIAL

## 2019-03-22 VITALS
DIASTOLIC BLOOD PRESSURE: 86 MMHG | TEMPERATURE: 98.2 F | HEART RATE: 95 BPM | WEIGHT: 125.7 LBS | OXYGEN SATURATION: 99 % | SYSTOLIC BLOOD PRESSURE: 125 MMHG | BODY MASS INDEX: 20.2 KG/M2 | RESPIRATION RATE: 18 BRPM | HEIGHT: 66 IN

## 2019-03-22 DIAGNOSIS — G43.009 MIGRAINE WITHOUT AURA AND WITHOUT STATUS MIGRAINOSUS, NOT INTRACTABLE: Primary | ICD-10-CM

## 2019-03-22 RX ORDER — TOPIRAMATE 25 MG/1
TABLET, FILM COATED ORAL
Qty: 180 TABLET | Refills: 3 | Status: SHIPPED | OUTPATIENT
Start: 2019-03-22 | End: 2020-03-29

## 2019-03-22 ASSESSMENT — MIFFLIN-ST. JEOR: SCORE: 1341.92

## 2019-03-22 ASSESSMENT — PAIN SCALES - GENERAL: PAINLEVEL: NO PAIN (0)

## 2019-03-22 NOTE — LETTER
3/22/2019       RE: John Arizmendi  [address] [facility] [address]  [place] [address]     Dear Colleague,    Thank you for referring your patient, John Arizmendi, to the [facility] NEUROLOGY at [facility]. Please see a copy of my visit note below.    Service Date: 2019      INTERVAL HISTORY:  John Arizmendi returns for followup of migraine.      Last month I saw her.  Her headaches had increased in frequency.  She had increased her Topamax dose to 75 mg and it definitely helped her headaches, but she developed a number of side effects including nausea, foggy headedness and tingling.      After the last visit, she did reduce her Topamax dose to 50 mg and started venlafaxine 37.5 mg.  She indicates the transition was a bit rough, but the last few weeks she has had no significant headaches and she seems to be tolerating the combination well aside from feeling a bit tired during the day.      I discussed tapering her off the Topamax completely and just using venlafaxine at this point, or continuing with the combination of the 2 drugs which currently has been very effective.  For now, she does not want to make any changes as she is doing so well, so she will continue on Topamax 50 mg and venlafaxine 37.5 mg.        I am going to be seeing her back in 3 months.  She will contact me sooner if she has any problems.                 AGAPITO LEE MD             D: 2019   T: 2019   MT: KAYLA      Name:     JOHN ARIZMENDI   MRN:      [id number]        Account:      [id number]   :      1995           Service Date: 2019      Document: [id number]

## 2019-03-22 NOTE — NURSING NOTE
Chief Complaint   Patient presents with     RECHECK     [facility] return patient visit for 1 month follow up        Lynsey Vicente MA

## 2019-06-28 ENCOUNTER — OFFICE VISIT (OUTPATIENT)
Dept: NEUROLOGY | Facility: CLINIC | Age: 24
End: 2019-06-28
Payer: COMMERCIAL

## 2019-06-28 VITALS — DIASTOLIC BLOOD PRESSURE: 78 MMHG | HEART RATE: 77 BPM | OXYGEN SATURATION: 97 % | SYSTOLIC BLOOD PRESSURE: 108 MMHG

## 2019-06-28 DIAGNOSIS — G43.019 INTRACTABLE MIGRAINE WITHOUT AURA AND WITHOUT STATUS MIGRAINOSUS: ICD-10-CM

## 2019-06-28 DIAGNOSIS — G43.019 INTRACTABLE MIGRAINE WITHOUT AURA AND WITHOUT STATUS MIGRAINOSUS: Primary | ICD-10-CM

## 2019-06-28 LAB
ANION GAP SERPL CALCULATED.3IONS-SCNC: 5 MMOL/L (ref 3–14)
BUN SERPL-MCNC: 13 MG/DL (ref 7–30)
CALCIUM SERPL-MCNC: 8.9 MG/DL (ref 8.5–10.1)
CHLORIDE SERPL-SCNC: 110 MMOL/L (ref 94–109)
CO2 SERPL-SCNC: 24 MMOL/L (ref 20–32)
CREAT SERPL-MCNC: 0.91 MG/DL (ref 0.52–1.04)
GFR SERPL CREATININE-BSD FRML MDRD: 89 ML/MIN/{1.73_M2}
GLUCOSE SERPL-MCNC: 76 MG/DL (ref 70–99)
POTASSIUM SERPL-SCNC: 4 MMOL/L (ref 3.4–5.3)
SODIUM SERPL-SCNC: 139 MMOL/L (ref 133–144)

## 2019-06-28 RX ORDER — GABAPENTIN 100 MG/1
100 CAPSULE ORAL 3 TIMES DAILY
Qty: 90 CAPSULE | Refills: 3 | Status: SHIPPED | OUTPATIENT
Start: 2019-06-28 | End: 2021-01-22

## 2019-06-28 ASSESSMENT — PAIN SCALES - GENERAL: PAINLEVEL: MILD PAIN (2)

## 2019-06-28 NOTE — NURSING NOTE
Chief Complaint   Patient presents with     RECHECK     [facility] RETURN 3 MO F/U FOR HEADACHES       Seda Ness, EMT

## 2019-06-28 NOTE — PROGRESS NOTES
Service Date: 06/28/2019      HISTORY OF PRESENT ILLNESS:  Carito Varela returns for followup of chronic migraine without aura.      When I saw her in March, she was actually doing very well with a combination of Topamax 50 mg and venlafaxine 37.5 mg.  Unfortunately, her headaches have gotten bad again.  She is having daily headaches and she started taking more Excedrin.  She indicates she feels tired on the venlafaxine.  She has not been able to tolerate doses of Topamax over 50 mg in the past and also had trouble tolerating nortriptyline.      She tells me in July, she is going to go on a 2-week trip to [place] and will be at a higher altitudes.      CURRENT MEDICATIONS:   1.  Excedrin   2.  Oral contraceptive.   3.  Spironolactone   4.  Topamax 50 mg.   5.  Venlafaxine 37.5 mg.      PHYSICAL EXAMINATION:  Examination reveals her heart rate is 77.  Blood pressure 108/78.  Funduscopic examination reveals sharp disc margins.  Pupils are equal, round and react well to light.  Visual fields are intact.  Cranial nerves II-XII are intact.  Motor, sensory, cerebellar and gait testing are normal.  Reflexes are 2+.  Plantar responses are flexor.      IMPRESSION:  Chronic migraine.      PLAN:  She had questions about medications more specific for migraine and I believe she was referring to calcitonin gene receptor antagonists.  We discussed these.  I also discussed Botox with her.  I think she would be a candidate for one or the other.  She would be interested in seeing one of the specialists in the [facility] and I am making a referral.      She is going to stop the venlafaxine as she believes it is making her feel very fatigued and currently it is not helping her headaches any longer.      She is going to continue on Topamax 50 mg, which has been helpful in the past.  She cannot tolerate higher doses.      I am going to check a basic metabolic panel today and I will communicate those results to her via [employer]. (BMP OK)      We did discuss her planned trip to [place].  She is aware that there may be a problem at higher altitudes in terms of precipitating or worsening her headaches.  She is going to be meeting with a travel doctor before leaving.        Finally, I discussed adding a low dose of gabapentin to see if this will help with her current daily headaches.  I reviewed potential side effects.  She will start on 100 mg 3 times a day.  If she develops any symptoms such as fatigue or drowsiness during the daytime, she can take the full dose at night.  I also cautioned her about depression on the drug, and if that were to develop, she should stop the gabapentin immediately.      ADDENDUM:  Total visit time 25 minutes.  More than 50% of this time was spent in counseling and coordination of care.         AGAPITO LEE MD             D: 2019   T: 2019   MT: jose      Name:     JOHN ARIZMENDI   MRN:      [id number]        Account:      [id number]   :      1995           Service Date: 2019      Document: [id number]

## 2019-06-28 NOTE — LETTER
6/28/2019       RE: Carito Varela  [address] [facility] [address]  [place] [address]     Dear Colleague,    Thank you for referring your patient, Carito Varela, to the [facility] NEUROLOGY at [facility]. Please see a copy of my visit note below.    Service Date: 06/28/2019      HISTORY OF PRESENT ILLNESS:  Carito Varela returns for followup of chronic migraine without aura.      When I saw her in March, she was actually doing very well with a combination of Topamax 50 mg and venlafaxine 37.5 mg.  Unfortunately, her headaches have gotten bad again.  She is having daily headaches and she started taking more Excedrin.  She indicates she feels tired on the venlafaxine.  She has not been able to tolerate doses of Topamax over 50 mg in the past and also had trouble tolerating nortriptyline.      She tells me in July, she is going to go on a 2-week trip to [place] and will be at a higher altitudes.      CURRENT MEDICATIONS:   1.  Excedrin   2.  Oral contraceptive.   3.  Spironolactone   4.  Topamax 50 mg.   5.  Venlafaxine 37.5 mg.      PHYSICAL EXAMINATION:  Examination reveals her heart rate is 77.  Blood pressure 108/78.  Funduscopic examination reveals sharp disc margins.  Pupils are equal, round and react well to light.  Visual fields are intact.  Cranial nerves II-XII are intact.  Motor, sensory, cerebellar and gait testing are normal.  Reflexes are 2+.  Plantar responses are flexor.      IMPRESSION:  Chronic migraine.      PLAN:  She had questions about medications more specific for migraine and I believe she was referring to calcitonin gene receptor antagonists.  We discussed these.  I also discussed Botox with her.  I think she would be a candidate for one or the other.  She would be interested in seeing one of the specialists in the [facility] and I am making a referral.      She is going to stop the venlafaxine as she believes it is making her feel very fatigued and  currently it is not helping her headaches any longer.      She is going to continue on Topamax 50 mg, which has been helpful in the past.  She cannot tolerate higher doses.      I am going to check a basic metabolic panel today and I will communicate those results to her via [facility]. (BMP OK)     We did discuss her planned trip to [place].  She is aware that there may be a problem at higher altitudes in terms of precipitating or worsening her headaches.  She is going to be meeting with a travel doctor before leaving.        Finally, I discussed adding a low dose of gabapentin to see if this will help with her current daily headaches.  I reviewed potential side effects.  She will start on 100 mg 3 times a day.  If she develops any symptoms such as fatigue or drowsiness during the daytime, she can take the full dose at night.  I also cautioned her about depression on the drug, and if that were to develop, she should stop the gabapentin immediately.      ADDENDUM:  Total visit time 25 minutes.  More than 50% of this time was spent in counseling and coordination of care.         AGAPITO LEE MD             D: 2019   T: 2019   MT: jose      Name:     JOHN ARIZMENDI   MRN:      [id number]        Account:      [id number]   :      1995           Service Date: 2019      Document: [id number]

## 2019-07-05 ENCOUNTER — TELEPHONE (OUTPATIENT)
Dept: NEUROLOGY | Age: 24
End: 2019-07-05

## 2019-07-26 ENCOUNTER — PRE VISIT (OUTPATIENT)
Dept: NEUROLOGY | Facility: CLINIC | Age: 24
End: 2019-07-26

## 2019-07-26 NOTE — TELEPHONE ENCOUNTER
FUTURE VISIT INFORMATION      FUTURE VISIT INFORMATION:    Date: 8/14/2019    Time: 7AM    [place] [facility] [place] INFORMATION:    Referring provider:  Dr. Sterling    Referring providers clinic:  [facility]     Reason for visit/diagnosis  Migraines    RECORDS REQUESTED FROM:       Clinic name Comments Records Status Imaging Status   [facility] Everyhere N/A   [facility] Everywhere N/A

## 2019-08-14 ENCOUNTER — OFFICE VISIT (OUTPATIENT)
Dept: NEUROLOGY | Facility: CLINIC | Age: 24
End: 2019-08-14
Attending: PSYCHIATRY & NEUROLOGY
Payer: COMMERCIAL

## 2019-08-14 VITALS
WEIGHT: 124 LBS | BODY MASS INDEX: 20.01 KG/M2 | SYSTOLIC BLOOD PRESSURE: 113 MMHG | OXYGEN SATURATION: 100 % | HEART RATE: 71 BPM | DIASTOLIC BLOOD PRESSURE: 74 MMHG

## 2019-08-14 DIAGNOSIS — G43.709 CHRONIC MIGRAINE WITHOUT AURA WITHOUT STATUS MIGRAINOSUS, NOT INTRACTABLE: Primary | ICD-10-CM

## 2019-08-14 RX ORDER — NARATRIPTAN 1 MG/1
1 TABLET ORAL
Qty: 18 TABLET | Refills: 3 | Status: SHIPPED | OUTPATIENT
Start: 2019-08-14 | End: 2021-01-22

## 2019-08-14 RX ORDER — VENLAFAXINE HYDROCHLORIDE 37.5 MG/1
37.5 CAPSULE, EXTENDED RELEASE ORAL DAILY
COMMUNITY
End: 2021-01-22

## 2019-08-14 ASSESSMENT — ENCOUNTER SYMPTOMS
BACK PAIN: 1
CONSTIPATION: 1
MYALGIAS: 1
BLOOD IN STOOL: 0
JOINT SWELLING: 0
ABDOMINAL PAIN: 1
WEAKNESS: 0
MUSCLE CRAMPS: 0
BOWEL INCONTINENCE: 0
ARTHRALGIAS: 0
TREMORS: 0
DEPRESSION: 0
NECK PAIN: 1
PARALYSIS: 0
MEMORY LOSS: 0
MUSCLE WEAKNESS: 0
DIZZINESS: 1
SEIZURES: 0
NERVOUS/ANXIOUS: 1
HEADACHES: 1
LOSS OF CONSCIOUSNESS: 0
TINGLING: 0
JAUNDICE: 0
DECREASED CONCENTRATION: 1
NUMBNESS: 0
DISTURBANCES IN COORDINATION: 0
VOMITING: 0
HEARTBURN: 0
BLOATING: 1
INSOMNIA: 1
DIARRHEA: 1
SPEECH CHANGE: 0
STIFFNESS: 0
PANIC: 0
RECTAL PAIN: 0
NAUSEA: 1

## 2019-08-14 ASSESSMENT — HEADACHE IMPACT TEST (HIT 6)
HIT6 TOTAL SCORE: 65
HOW OFTEN DO HEADACHES LIMIT YOUR DAILY ACTIVITIES: SOMETIMES
WHEN YOU HAVE HEADACHES HOW OFTEN IS THE PAIN SEVERE: VERY OFTEN
HOW OFTEN HAVE YOU FELT TOO TIRED TO WORK BECAUSE OF YOUR HEADACHES: VERY OFTEN
HOW OFTEN DID HEADACHS LIMIT CONCENTRATION ON WORK OR DAILY ACTIVITY: VERY OFTEN
WHEN YOU HAVE A HEADACHE HOW OFTEN DO YOU WISH YOU COULD LIE DOWN: VERY OFTEN
HIT6 TOTAL SCORE: 65
HOW OFTEN DID HEADACHS LIMIT CONCENTRATION ON WORK OR DAILY ACTIVITY: VERY OFTEN
HOW OFTEN HAVE YOU FELT FED UP OR IRRITATED BECAUSE OF YOUR HEADACHES: VERY OFTEN
HOW OFTEN HAVE YOU FELT TOO TIRED TO WORK BECAUSE OF YOUR HEADACHES: VERY OFTEN
HOW OFTEN HAVE YOU FELT FED UP OR IRRITATED BECAUSE OF YOUR HEADACHES: VERY OFTEN
WHEN YOU HAVE A HEADACHE HOW OFTEN DO YOU WISH YOU COULD LIE DOWN: VERY OFTEN
WHEN YOU HAVE HEADACHES HOW OFTEN IS THE PAIN SEVERE: VERY OFTEN
HOW OFTEN DO HEADACHES LIMIT YOUR DAILY ACTIVITIES: SOMETIMES

## 2019-08-14 ASSESSMENT — PAIN SCALES - GENERAL: PAINLEVEL: NO PAIN (0)

## 2019-08-14 NOTE — PROGRESS NOTES
[facility]  Neurology Consult     Carito Varela MRN# [id number]   YOB: 1995 Age: 24 year old     Requesting physician: Dr. Sterling         Assessment and Recommendations:   Carito Varela is a 24 year old female who presents to the neurology clinic for further evaluation of headache.    Her headache presentation is consistent with a long history of migraine, which she has on a genetic basis likely, given her family history with migraine on both sides.  This has been increased for some time, and she now has a 15-20 headache days a month, and meets criteria for chronic migraine without aura.  Her neurologic exam is intact today.  She has had previous head imaging that was unrevealing for secondary causes of headache.  I did not recommend any further work-up for headache today.    Moving forward, we discussed a symptomatic treatment strategy, focusing more on prevention.  Unfortunately, she has not had a good response with maximized doses of topiramate, venlafaxine, and gabapentin.  She is also tried nortriptyline in the past which caused side effects and was stopped.  She currently takes Spironolactone for other reasons, and this is not been helpful for headache.  She has a history of dizziness, and I did not recommend a beta-blocker out of fear of worsening this.  She also takes co-Q10 supplements, which are not clearly effective.   - I recommend a trial of botulinum toxin injections using a chronic migraine protocol.  We will attempt to get preauthorization, and we will plan to see her back for her first set of injections.  I recommend 3 sets of injections prior to determining effectiveness.  If botulinum toxin injections were helpful in reducing her headache burden, our plan would be for her to titrate off of topiramate and venlafaxine.  - If botulinum toxin injections were not tolerated or not effective after an adequate trial, a CGRP inhibitor  could be considered.  -We also discussed her acute treatment strategy of Excedrin up to twice a week.  I think she could continue this, and I offered a trial of a triptan in addition for her more severe headaches.  She is somewhat concerned about worsening dizziness with triptans, and we together decided to trial naratriptan 1 mg tablet the onset of headache, with a repeat dose in 4 hours if needed.  This should not exceed more than 9 days/month to avoid medication overuse.    I spent 40 minutes with the patient, over half of which was counseling.    Abril Salazar MD  Neurology  Pager: [phone]            Chief Complaint:   Chief Complaint   Patient presents with     Headache     [facility] NEW - INTRACTABLE MIGRAINE W/O AURA W/O STATUS MIGRAINOSUS           History is obtained from the patient and medical record.      Carito Varela is a 24 year old female who is been suffering from headaches since childhood.  Her headaches generally are left and retro-orbital, described as a stabbing or throbbing pain that feels like there are people poking her from behind her eye.  This pain is generally intense, rating a 6 or 7 out of 10, but can become even more severe to a 9 out of 10 and be associated with vomiting.  Her headaches are associated with photophobia, phonophobia, nausea, as well as vomiting.  They generally last 24 hours, but can last multiple days.  She denies any typical aura, positional component to her pain, autonomic features, or fevers or other illness associated with her headaches.  She does note a previous left zygomatic fracture and orbital floor fracture, but this occurred after the onset of headache, and did not affect the frequency or severity of her headache.  She is also noticed some left eye twitching at times with headache.     She also reports a history of slight scoliosis and temporomandibular joint dysfunction bilaterally.    She currently has 15-20 headache days a month, with at least 4 severe  headache days a month.  Her headaches had improved in the past, but then worsened again several months ago.  When the headache is present, she prefers to rub her left eye.  Triggers for headache include decreased sleep, eating poorly, or alcohol.    For treatment of headache, she currently takes Excedrin, and tries to limit this to 2 days a week, although she has more headache days than this.  She is never tried a triptan due to fear of worsening her baseline dizziness.    For prevention of headache, she currently takes topiramate 50 mg daily, and had side effects with higher doses, venlafaxine 37.5 mg daily, and co-Q10 daily.  She also takes spironolactone daily for skin.            Past Medical History:   She has a history of a left zygomatic fracture, left orbital floor fracture, TMJ, scoliosis, and hyperlipidemia          Past Surgical History:   She denies past surgical history.          Social History:   She is single and works in consulting, frequently using a computer.  She finds that her headaches make it difficult to look at the computer screen and make talking with people harder.  She denies smoking, drinks alcohol 1-3 times weekly, and denies drug use.  She drinks 1 cup of coffee and one Diet Coke daily.          Family History:   There is a family history of migraine in her mother, sister, grandmother, and paternal aunt.          Allergies:      Allergies   Allergen Reactions     Zofran [Ondansetron] Hives     Macrobid [Nitrofurantoin] Hives and Other (See Comments)     PN: joint pain             Medications:     Current Outpatient Medications:      aspirin-acetaminophen-caffeine (EXCEDRIN MIGRAINE) 250-250-65 MG tablet, Take 2 tablets by mouth every 6 hours as needed , Disp: , Rfl:      COENZYME Q-10 PO, Take 300 mg by mouth daily, Disp: , Rfl:      norethindrone-ethinyl estradiol (ORTHO-NOVUM 7/7/7) 0.5/0.75/1-35 MG-MCG per tablet, Take 1 tablet by mouth daily, Disp: , Rfl:      SPIRONOLACTONE PO,  Take 50 mg by mouth daily , Disp: , Rfl:      topiramate (TOPAMAX) 25 MG tablet, Two at night, Disp: 180 tablet, Rfl: 3     venlafaxine (EFFEXOR-XR) 37.5 MG 24 hr capsule, Take 37.5 mg by mouth daily, Disp: , Rfl:      vitamin D3 (CHOLECALCIFEROL) 2000 units tablet, Take 1 tablet by mouth daily, Disp: , Rfl:      gabapentin (NEURONTIN) 100 MG capsule, Take 1 capsule (100 mg) by mouth 3 times daily (Patient not taking: Reported on 8/14/2019), Disp: 90 capsule, Rfl: 3          Review of Systems:   Complete review of systems is negative, except for dizziness and as noted in the HPI.         Physical Exam:   /74 (BP Location: Left arm, Patient Position: Sitting, Cuff Size: Adult Regular)   Pulse 71   Wt 56.2 kg (124 lb)   SpO2 100%   BMI 20.01 kg/m     Physical Exam:   General: NAD  Neurologic:   Mental Status Exam: Alert, awake and oriented to situation. No dysarthria. Speech of normal fluency.   Cranial Nerves: Fundoscopic exam with clear disc margins bilaterally. PE (5 to 6 mm bilaterally) RRLA, EOMs intact, no nystagmus, facial movements symmetric, facial sensation intact to light touch, hearing intact to conversation, trapezius and SCMs 5/5 bilaterally, tongue midline and fully mobile. No tongue atrophy or fasciculations.    Motor: Normal tone in all four extremities, no atrophy or fasciculations. 5/5 strength bilaterally in shoulder abduction, elbow extensors and flexors, wrist extensors and flexors, hip flexors, knee extensors and flexors, dorsi- and plantarflexion. No tremors or abnormal movements noted.   Sensory: Sensation intact to light touch on arms and legs bilaterally.    Coordination: Finger-nose-finger intact bilaterally.  Rapidly alternating movements intact bilaterally in the upper extremities.  Normal finger tapping bilaterally.  Normal Romberg.   Reflexes: 2+ and symmetric in triceps, biceps, brachioradialis, patellar, Achilles, and plantars downgoing bilaterally.   Gait: Normal gait.   Able to toe and heel walk.  Tandem gait normal.  Head: Normocephalic, atraumatic. No radiating pain with palpation over the supraorbital notches, occipital nerves.  Temporal pulses intact.   Neck: Normal range of motion with lateral head movements and neck flexion.  Eyes: No conjunctival injection, no scleral icterus.   Respiratory: No increased work of breathing.  Cardiovascular: No lower extremity edema.  Extremities: Warm, dry.          Data:     MRI brain (December 30, 2014): Per report from [facility] in [facility], unremarkable

## 2019-08-14 NOTE — LETTER
8/14/2019      RE: Carito Varela  [address]  [place] [address]       [facility]  Neurology Consult     Carito Varela MRN# [id number]   YOB: 1995 Age: 24 year old     Requesting physician: Dr. Sterling         Assessment and Recommendations:   Carito Varela is a 24 year old female who presents to the neurology clinic for further evaluation of headache.    Her headache presentation is consistent with a long history of migraine, which she has on a genetic basis likely, given her family history with migraine on both sides.  This has been increased for some time, and she now has a 15-20 headache days a month, and meets criteria for chronic migraine without aura.  Her neurologic exam is intact today.  She has had previous head imaging that was unrevealing for secondary causes of headache.  I did not recommend any further work-up for headache today.    Moving forward, we discussed a symptomatic treatment strategy, focusing more on prevention.  Unfortunately, she has not had a good response with maximized doses of topiramate, venlafaxine, and gabapentin.  She is also tried nortriptyline in the past which caused side effects and was stopped.  She currently takes Spironolactone for other reasons, and this is not been helpful for headache.  She has a history of dizziness, and I did not recommend a beta-blocker out of fear of worsening this.  She also takes co-Q10 supplements, which are not clearly effective.   - I recommend a trial of botulinum toxin injections using a chronic migraine protocol.  We will attempt to get preauthorization, and we will plan to see her back for her first set of injections.  I recommend 3 sets of injections prior to determining effectiveness.  If botulinum toxin injections were helpful in reducing her headache burden, our plan would be for her to titrate off of topiramate and venlafaxine.  - If botulinum  toxin injections were not tolerated or not effective after an adequate trial, a CGRP inhibitor could be considered.  -We also discussed her acute treatment strategy of Excedrin up to twice a week.  I think she could continue this, and I offered a trial of a triptan in addition for her more severe headaches.  She is somewhat concerned about worsening dizziness with triptans, and we together decided to trial naratriptan 1 mg tablet the onset of headache, with a repeat dose in 4 hours if needed.  This should not exceed more than 9 days/month to avoid medication overuse.    I spent 40 minutes with the patient, over half of which was counseling.    Abril Salazar MD  Neurology  Pager: [phone]            Chief Complaint:   Chief Complaint   Patient presents with     Headache     [facility] NEW - INTRACTABLE MIGRAINE W/O AURA W/O STATUS MIGRAINOSUS           History is obtained from the patient and medical record.      Carito Varela is a 24 year old female who is been suffering from headaches since childhood.  Her headaches generally are left and retro-orbital, described as a stabbing or throbbing pain that feels like there are people poking her from behind her eye.  This pain is generally intense, rating a 6 or 7 out of 10, but can become even more severe to a 9 out of 10 and be associated with vomiting.  Her headaches are associated with photophobia, phonophobia, nausea, as well as vomiting.  They generally last 24 hours, but can last multiple days.  She denies any typical aura, positional component to her pain, autonomic features, or fevers or other illness associated with her headaches.  She does note a previous left zygomatic fracture and orbital floor fracture, but this occurred after the onset of headache, and did not affect the frequency or severity of her headache.  She is also noticed some left eye twitching at times with headache.     She also reports a history of slight scoliosis and temporomandibular joint  dysfunction bilaterally.    She currently has 15-20 headache days a month, with at least 4 severe headache days a month.  Her headaches had improved in the past, but then worsened again several months ago.  When the headache is present, she prefers to rub her left eye.  Triggers for headache include decreased sleep, eating poorly, or alcohol.    For treatment of headache, she currently takes Excedrin, and tries to limit this to 2 days a week, although she has more headache days than this.  She is never tried a triptan due to fear of worsening her baseline dizziness.    For prevention of headache, she currently takes topiramate 50 mg daily, and had side effects with higher doses, venlafaxine 37.5 mg daily, and co-Q10 daily.  She also takes spironolactone daily for skin.            Past Medical History:   She has a history of a left zygomatic fracture, left orbital floor fracture, TMJ, scoliosis, and hyperlipidemia          Past Surgical History:   She denies past surgical history.          Social History:   She is single and works in consulting, frequently using a computer.  She finds that her headaches make it difficult to look at the computer screen and make talking with people harder.  She denies smoking, drinks alcohol 1-3 times weekly, and denies drug use.  She drinks 1 cup of coffee and one Diet Coke daily.          Family History:   There is a family history of migraine in her mother, sister, grandmother, and paternal aunt.          Allergies:      Allergies   Allergen Reactions     Zofran [Ondansetron] Hives     Macrobid [Nitrofurantoin] Hives and Other (See Comments)     PN: joint pain             Medications:     Current Outpatient Medications:      aspirin-acetaminophen-caffeine (EXCEDRIN MIGRAINE) 250-250-65 MG tablet, Take 2 tablets by mouth every 6 hours as needed , Disp: , Rfl:      COENZYME Q-10 PO, Take 300 mg by mouth daily, Disp: , Rfl:      norethindrone-ethinyl estradiol (ORTHO-NOVUM 7/7/7)  0.5/0.75/1-35 MG-MCG per tablet, Take 1 tablet by mouth daily, Disp: , Rfl:      SPIRONOLACTONE PO, Take 50 mg by mouth daily , Disp: , Rfl:      topiramate (TOPAMAX) 25 MG tablet, Two at night, Disp: 180 tablet, Rfl: 3     venlafaxine (EFFEXOR-XR) 37.5 MG 24 hr capsule, Take 37.5 mg by mouth daily, Disp: , Rfl:      vitamin D3 (CHOLECALCIFEROL) 2000 units tablet, Take 1 tablet by mouth daily, Disp: , Rfl:      gabapentin (NEURONTIN) 100 MG capsule, Take 1 capsule (100 mg) by mouth 3 times daily (Patient not taking: Reported on 8/14/2019), Disp: 90 capsule, Rfl: 3          Review of Systems:   Complete review of systems is negative, except for dizziness and as noted in the HPI.         Physical Exam:   /74 (BP Location: Left arm, Patient Position: Sitting, Cuff Size: Adult Regular)   Pulse 71   Wt 56.2 kg (124 lb)   SpO2 100%   BMI 20.01 kg/m      Physical Exam:   General: NAD  Neurologic:   Mental Status Exam: Alert, awake and oriented to situation. No dysarthria. Speech of normal fluency.   Cranial Nerves: Fundoscopic exam with clear disc margins bilaterally. PE (5 to 6 mm bilaterally) RRLA, EOMs intact, no nystagmus, facial movements symmetric, facial sensation intact to light touch, hearing intact to conversation, trapezius and SCMs 5/5 bilaterally, tongue midline and fully mobile. No tongue atrophy or fasciculations.    Motor: Normal tone in all four extremities, no atrophy or fasciculations. 5/5 strength bilaterally in shoulder abduction, elbow extensors and flexors, wrist extensors and flexors, hip flexors, knee extensors and flexors, dorsi- and plantarflexion. No tremors or abnormal movements noted.   Sensory: Sensation intact to light touch on arms and legs bilaterally.    Coordination: Finger-nose-finger intact bilaterally.  Rapidly alternating movements intact bilaterally in the upper extremities.  Normal finger tapping bilaterally.  Normal Romberg.   Reflexes: 2+ and symmetric in triceps,  biceps, brachioradialis, patellar, Achilles, and plantars downgoing bilaterally.   Gait: Normal gait.  Able to toe and heel walk.  Tandem gait normal.  Head: Normocephalic, atraumatic. No radiating pain with palpation over the supraorbital notches, occipital nerves.  Temporal pulses intact.   Neck: Normal range of motion with lateral head movements and neck flexion.  Eyes: No conjunctival injection, no scleral icterus.   Respiratory: No increased work of breathing.  Cardiovascular: No lower extremity edema.  Extremities: Warm, dry.          Data:     MRI brain (December 30, 2014): Per report from [facility] in [facility]      Abril Salazar MD

## 2019-08-14 NOTE — NURSING NOTE
Chief Complaint   Patient presents with     Headache     [facility] NEW - INTRACTABLE MIGRAINE W/O AURA W/O STATUS MIGRAINOSUS       Aidan Mohan, EMT

## 2019-08-14 NOTE — LETTER
8/14/2019       RE: Carito Varela  [address]  [facility] [address]     Dear Colleague,    Thank you for referring your patient, Carito Varela, to the [facility] NEUROLOGY at [facility]. Please see a copy of my visit note below.    [facility]   [facility]  Neurology Consult     Carito Varela MRN# [id number]   YOB: 1995 Age: 24 year old     Requesting physician: Dr. Sterling         Assessment and Recommendations:   Carito Varela is a 24 year old female who presents to the neurology clinic for further evaluation of headache.    Her headache presentation is consistent with a long history of migraine, which she has on a genetic basis likely, given her family history with migraine on both sides.  This has been increased for some time, and she now has a 15-20 headache days a month, and meets criteria for chronic migraine without aura.  Her neurologic exam is intact today.  She has had previous head imaging that was unrevealing for secondary causes of headache.  I did not recommend any further work-up for headache today.    Moving forward, we discussed a symptomatic treatment strategy, focusing more on prevention.  Unfortunately, she has not had a good response with maximized doses of topiramate, venlafaxine, and gabapentin.  She is also tried nortriptyline in the past which caused side effects and was stopped.  She currently takes Spironolactone for other reasons, and this is not been helpful for headache.  She has a history of dizziness, and I did not recommend a beta-blocker out of fear of worsening this.  She also takes co-Q10 supplements, which are not clearly effective.   - I recommend a trial of botulinum toxin injections using a chronic migraine protocol.  We will attempt to get preauthorization, and we will plan to see her back for her first set of injections.  I recommend 3 sets of injections prior to  determining effectiveness.  If botulinum toxin injections were helpful in reducing her headache burden, our plan would be for her to titrate off of topiramate and venlafaxine.  - If botulinum toxin injections were not tolerated or not effective after an adequate trial, a CGRP inhibitor could be considered.  -We also discussed her acute treatment strategy of Excedrin up to twice a week.  I think she could continue this, and I offered a trial of a triptan in addition for her more severe headaches.  She is somewhat concerned about worsening dizziness with triptans, and we together decided to trial naratriptan 1 mg tablet the onset of headache, with a repeat dose in 4 hours if needed.  This should not exceed more than 9 days/month to avoid medication overuse.    I spent 40 minutes with the patient, over half of which was counseling.    Abril Salazar MD  Neurology  Pager: [phone]            Chief Complaint:   Chief Complaint   Patient presents with     Headache     UMP NEW - INTRACTABLE MIGRAINE W/O AURA W/O STATUS MIGRAINOSUS           History is obtained from the patient and medical record.      Carito Varela is a 24 year old female who is been suffering from headaches since childhood.  Her headaches generally are left and retro-orbital, described as a stabbing or throbbing pain that feels like there are people poking her from behind her eye.  This pain is generally intense, rating a 6 or 7 out of 10, but can become even more severe to a 9 out of 10 and be associated with vomiting.  Her headaches are associated with photophobia, phonophobia, nausea, as well as vomiting.  They generally last 24 hours, but can last multiple days.  She denies any typical aura, positional component to her pain, autonomic features, or fevers or other illness associated with her headaches.  She does note a previous left zygomatic fracture and orbital floor fracture, but this occurred after the onset of headache, and did not affect the  frequency or severity of her headache.  She is also noticed some left eye twitching at times with headache.     She also reports a history of slight scoliosis and temporomandibular joint dysfunction bilaterally.    She currently has 15-20 headache days a month, with at least 4 severe headache days a month.  Her headaches had improved in the past, but then worsened again several months ago.  When the headache is present, she prefers to rub her left eye.  Triggers for headache include decreased sleep, eating poorly, or alcohol.    For treatment of headache, she currently takes Excedrin, and tries to limit this to 2 days a week, although she has more headache days than this.  She is never tried a triptan due to fear of worsening her baseline dizziness.    For prevention of headache, she currently takes topiramate 50 mg daily, and had side effects with higher doses, venlafaxine 37.5 mg daily, and co-Q10 daily.  She also takes spironolactone daily for skin.            Past Medical History:   She has a history of a left zygomatic fracture, left orbital floor fracture, TMJ, scoliosis, and hyperlipidemia          Past Surgical History:   She denies past surgical history.          Social History:   She is single and works in [employer], frequently using a computer.  She finds that her headaches make it difficult to look at the computer screen and make talking with people harder.  She denies smoking, drinks alcohol 1-3 times weekly, and denies drug use.  She drinks 1 cup of coffee and one Diet Coke daily.          Family History:   There is a family history of migraine in her mother, sister, grandmother, and paternal aunt.          Allergies:      Allergies   Allergen Reactions     Zofran [Ondansetron] Hives     Macrobid [Nitrofurantoin] Hives and Other (See Comments)     PN: joint pain             Medications:     Current Outpatient Medications:      aspirin-acetaminophen-caffeine (EXCEDRIN MIGRAINE) 250-250-65 MG tablet,  Take 2 tablets by mouth every 6 hours as needed , Disp: , Rfl:      COENZYME Q-10 PO, Take 300 mg by mouth daily, Disp: , Rfl:      norethindrone-ethinyl estradiol (ORTHO-NOVUM 7/7/7) 0.5/0.75/1-35 MG-MCG per tablet, Take 1 tablet by mouth daily, Disp: , Rfl:      SPIRONOLACTONE PO, Take 50 mg by mouth daily , Disp: , Rfl:      topiramate (TOPAMAX) 25 MG tablet, Two at night, Disp: 180 tablet, Rfl: 3     venlafaxine (EFFEXOR-XR) 37.5 MG 24 hr capsule, Take 37.5 mg by mouth daily, Disp: , Rfl:      vitamin D3 (CHOLECALCIFEROL) 2000 units tablet, Take 1 tablet by mouth daily, Disp: , Rfl:      gabapentin (NEURONTIN) 100 MG capsule, Take 1 capsule (100 mg) by mouth 3 times daily (Patient not taking: Reported on 8/14/2019), Disp: 90 capsule, Rfl: 3          Review of Systems:   Complete review of systems is negative, except for dizziness and as noted in the HPI.         Physical Exam:   /74 (BP Location: Left arm, Patient Position: Sitting, Cuff Size: Adult Regular)   Pulse 71   Wt 56.2 kg (124 lb)   SpO2 100%   BMI 20.01 kg/m      Physical Exam:   General: NAD  Neurologic:   Mental Status Exam: Alert, awake and oriented to situation. No dysarthria. Speech of normal fluency.   Cranial Nerves: Fundoscopic exam with clear disc margins bilaterally. PE (5 to 6 mm bilaterally) RRLA, EOMs intact, no nystagmus, facial movements symmetric, facial sensation intact to light touch, hearing intact to conversation, trapezius and SCMs 5/5 bilaterally, tongue midline and fully mobile. No tongue atrophy or fasciculations.    Motor: Normal tone in all four extremities, no atrophy or fasciculations. 5/5 strength bilaterally in shoulder abduction, elbow extensors and flexors, wrist extensors and flexors, hip flexors, knee extensors and flexors, dorsi- and plantarflexion. No tremors or abnormal movements noted.   Sensory: Sensation intact to light touch on arms and legs bilaterally.    Coordination: Finger-nose-finger intact  bilaterally.  Rapidly alternating movements intact bilaterally in the upper extremities.  Normal finger tapping bilaterally.  Normal Romberg.   Reflexes: 2+ and symmetric in triceps, biceps, brachioradialis, patellar, Achilles, and plantars downgoing bilaterally.   Gait: Normal gait.  Able to toe and heel walk.  Tandem gait normal.  Head: Normocephalic, atraumatic. No radiating pain with palpation over the supraorbital notches, occipital nerves.  Temporal pulses intact.   Neck: Normal range of motion with lateral head movements and neck flexion.  Eyes: No conjunctival injection, no scleral icterus.   Respiratory: No increased work of breathing.  Cardiovascular: No lower extremity edema.  Extremities: Warm, dry.          Data:     MRI brain (December 30, 2014): Per report from [facility] in [facility], unremarkable        Again, thank you for allowing me to participate in the care of your patient.      Sincerely,    Abril Salazar MD

## 2019-08-19 ENCOUNTER — TELEPHONE (OUTPATIENT)
Dept: NEUROLOGY | Facility: CLINIC | Age: 24
End: 2019-08-19

## 2019-08-19 NOTE — TELEPHONE ENCOUNTER
[facility] Call Center    Phone Message    May a detailed message be left on voicemail: yes    Reason for Call: Other: Carito calling to request a call back. She has some questions on her botox appt 08/23. Please call her back to discuss.      Action Taken: Message routed to:  [facility] ([facility]): [facility]

## 2019-08-21 NOTE — TELEPHONE ENCOUNTER
[facility]    Phone Message    May a detailed message be left on voicemail: yes    Reason for Call: Other: Carito returning call. Please call her back.      Action Taken: Message routed to:  [facility] ([facility]

## 2019-08-22 NOTE — TELEPHONE ENCOUNTER
Spoke to patient and informed her that she is approved for botox. She was unsure how much this would cost out of pocket. I said that if she normally has a co-pay then it would have that, but she should really call her insurance company to ask them the cost out of pocket and if she has a deductible, etc. Patient has the Botox appt tomorrow, 8/23/2019 with Dr Salazar. She confirmed her understanding and will call her insurance after we were done talking.

## 2019-08-22 NOTE — TELEPHONE ENCOUNTER
[facility] Call Center    Phone Message    May a detailed message be left on voicemail: yes    Reason for Call: Other: Per call from PT is returning calls to [place].  Attempted to contact via [place]. Please reach out to the PT as she has questions re: botox injection costs.      Action Taken: Message routed to:  [facility] ([facility]): Neurology

## 2019-08-28 ENCOUNTER — OFFICE VISIT (OUTPATIENT)
Dept: PHYSICAL MEDICINE AND REHAB | Facility: CLINIC | Age: 24
End: 2019-08-28
Payer: COMMERCIAL

## 2019-08-28 VITALS
DIASTOLIC BLOOD PRESSURE: 70 MMHG | WEIGHT: 125.7 LBS | SYSTOLIC BLOOD PRESSURE: 105 MMHG | TEMPERATURE: 98.2 F | BODY MASS INDEX: 20.29 KG/M2 | HEART RATE: 72 BPM | OXYGEN SATURATION: 100 %

## 2019-08-28 DIAGNOSIS — G43.719 CHRONIC MIGRAINE WITHOUT AURA, INTRACTABLE, WITHOUT STATUS MIGRAINOSUS: Primary | ICD-10-CM

## 2019-08-28 ASSESSMENT — PAIN SCALES - GENERAL: PAINLEVEL: NO PAIN (0)

## 2019-08-28 NOTE — PROGRESS NOTES
[place], [place]  Botulinum Toxin Procedure    Kaitlin Bardales MD  Physical Medicine & Rehabilitation    August 28, 2019    Procedure:  OnabotulinumtoxinA injections for chronic migraine  Indication:  Chronic migraine    Ms. Varela suffers from severe intractable headaches.  She was referred by Dr. Salazar for onabotulinumtoxinA injections for headache.  Risks, benefits, and alternatives were discussed.  All questions were answered and consent given.  She decided to proceed with the injections. These are her first series of Botox injections for management of her chronic migraines.     Ms. Varela reports 15-20 headache days per month, with at least 4 severe headache days per month. Her headaches are quite disabling and often interfere with her ability to function normally.      She has attempted other migraine prophylactic treatments in the past, which have included: topiramate, venlafaxine, gabapentin, nortriptyline and CoQ 10.      She currently takes topiramate for headache prevention, and Excedrin and naratriptan as migraine abortive.    Ms. Varela's pain was assessed prior to the procedure.  She rated her pain today as 0 out of 10.    Procedural Pause: Procedural pause was conducted to verify correct patient identity, procedure to be performed, correct side and site, correct patient position, and special requirements. Appropriate hand hygiene was utilized, and each injection site was prepped with alcohol wipes or Chloraprep swab.     Procedure Details: From lot number [id number], NDC number [id number], expiration date 03/2022, 200 units of onabotulinumtoxinA was diluted in 4 mL preservative free normal saline. A total of 150 units of onabotulinumtoxinA were injected using 30 gauge 0.5 in needles into the muscles listed below. 50 units of onabotulinumtoxinA were wasted.     Injection Sites: Total = 150 units onabotulinumtoxinA      and Procerus muscles - 5 units into the  left and right corrugators and 5 units into the procerus (15 units total)    Frontalis muscles - 5 units into the left superior frontalis and 5 unites into the right superior frontalis (2 injection sites per muscle) (10 units total)    Temporalis muscles - 12.5 units into the left temporalis muscle and 12.5 units into the right temporalis muscle (2 injection sites per muscle) (25 units total)    Occipitalis muscles - 12.5 units into the left occipitalis muscle and 12.5 units into the right occipitalis muscle (2 injection sites per muscle) (25 units total)    Splenius Capitis muscles - 12.5 units into the left splenius capitis muscle and 12.5 units into the right splenius capitis muscle (2 injection sites per muscle, divided into 2/3 anteriorly and 1/3 posteriorly) (25 units total)      Trapezius muscles - 25 units into the left trapezius muscle and 25 units into the right trapezius muscle (3 injection sites per muscle, divided 5 units, 10 units, 10 units, medial to  lateral) (50 units total)    Ms. Varela tolerated the procedure well without immediate complications.  She will follow up in clinic for assessment of the effectiveness of treatment.  She did not report any change in her pain level after the botulinumtoxinA injection procedure.    Kaitlin Bardales MD  Pager: [phone]    Physical Medicine & Rehabilitation  [facility]  Clinics and Surgery Center  [address] [place] [id number]

## 2019-08-28 NOTE — LETTER
8/28/2019       RE: Carito Varela  [address] [facility] [address]  [facility] [address]     Dear Colleague,    Thank you for referring your patient, Carito Varela, to the [facility] PHYSICAL MEDICINE AND REHABILITATION at [facility]. Please see a copy of my visit note below.    [place], [place]  Botulinum Toxin Procedure    Kaitlin Bardales MD  Physical Medicine & Rehabilitation    August 28, 2019    Procedure:  OnabotulinumtoxinA injections for chronic migraine  Indication:  Chronic migraine    Ms. Varela suffers from severe intractable headaches.  She was referred by Dr. Salazar for onabotulinumtoxinA injections for headache.  Risks, benefits, and alternatives were discussed.  All questions were answered and consent given.  She decided to proceed with the injections. These are her first series of Botox injections for management of her chronic migraines.     Ms. Varela reports 15-20 headache days per month, with at least 4 severe headache days per month. Her headaches are quite disabling and often interfere with her ability to function normally.      She has attempted other migraine prophylactic treatments in the past, which have included: topiramate, venlafaxine, gabapentin, nortriptyline and CoQ 10.      She currently takes topiramate for headache prevention, and Excedrin and naratriptan as migraine abortive.    Ms. Varela's pain was assessed prior to the procedure.  She rated her pain today as 0 out of 10.    Procedural Pause: Procedural pause was conducted to verify correct patient identity, procedure to be performed, correct side and site, correct patient position, and special requirements. Appropriate hand hygiene was utilized, and each injection site was prepped with alcohol wipes or Chloraprep swab.     Procedure Details: From lot number [id number], NDC number [id number], expiration date 03/2022, 200 units of onabotulinumtoxinA was diluted in  4 mL preservative free normal saline. A total of 150 units of onabotulinumtoxinA were injected using 30 gauge 0.5 in needles into the muscles listed below. 50 units of onabotulinumtoxinA were wasted.     Injection Sites: Total = 150 units onabotulinumtoxinA      and Procerus muscles - 5 units into the left and right corrugators and 5 units into the procerus (15 units total)    Frontalis muscles - 5 units into the left superior frontalis and 5 unites into the right superior frontalis (2 injection sites per muscle) (10 units total)    Temporalis muscles - 12.5 units into the left temporalis muscle and 12.5 units into the right temporalis muscle (2 injection sites per muscle) (25 units total)    Occipitalis muscles - 12.5 units into the left occipitalis muscle and 12.5 units into the right occipitalis muscle (2 injection sites per muscle) (25 units total)    Splenius Capitis muscles - 12.5 units into the left splenius capitis muscle and 12.5 units into the right splenius capitis muscle (2 injection sites per muscle, divided into 2/3 anteriorly and 1/3 posteriorly) (25 units total)      Trapezius muscles - 25 units into the left trapezius muscle and 25 units into the right trapezius muscle (3 injection sites per muscle, divided 5 units, 10 units, 10 units, medial to  lateral) (50 units total)    Ms. Varela tolerated the procedure well without immediate complications.  She will follow up in clinic for assessment of the effectiveness of treatment.  She did not report any change in her pain level after the botulinumtoxinA injection procedure.    Kaitlin Bardales MD  Pager: [phone]    Physical Medicine & Rehabilitation  [facility]  Clinics and Surgery Center  [address] [place] [id number]

## 2019-09-29 ENCOUNTER — HEALTH MAINTENANCE LETTER (OUTPATIENT)
Age: 24
End: 2019-09-29

## 2019-11-20 ENCOUNTER — OFFICE VISIT (OUTPATIENT)
Dept: PHYSICAL MEDICINE AND REHAB | Facility: CLINIC | Age: 24
End: 2019-11-20
Payer: COMMERCIAL

## 2019-11-20 VITALS
OXYGEN SATURATION: 98 % | RESPIRATION RATE: 16 BRPM | SYSTOLIC BLOOD PRESSURE: 113 MMHG | WEIGHT: 129 LBS | HEART RATE: 75 BPM | BODY MASS INDEX: 20.82 KG/M2 | DIASTOLIC BLOOD PRESSURE: 72 MMHG

## 2019-11-20 DIAGNOSIS — G43.719 CHRONIC MIGRAINE WITHOUT AURA, INTRACTABLE, WITHOUT STATUS MIGRAINOSUS: Primary | ICD-10-CM

## 2019-11-20 ASSESSMENT — HEADACHE IMPACT TEST (HIT 6)
WHEN YOU HAVE HEADACHES HOW OFTEN IS THE PAIN SEVERE: VERY OFTEN
WHEN YOU HAVE HEADACHES HOW OFTEN IS THE PAIN SEVERE: VERY OFTEN
HOW OFTEN HAVE YOU FELT TOO TIRED TO WORK BECAUSE OF YOUR HEADACHES: SOMETIMES
HIT6 TOTAL SCORE: 62
WHEN YOU HAVE A HEADACHE HOW OFTEN DO YOU WISH YOU COULD LIE DOWN: VERY OFTEN
HOW OFTEN HAVE YOU FELT FED UP OR IRRITATED BECAUSE OF YOUR HEADACHES: SOMETIMES
HOW OFTEN DID HEADACHS LIMIT CONCENTRATION ON WORK OR DAILY ACTIVITY: SOMETIMES
WHEN YOU HAVE A HEADACHE HOW OFTEN DO YOU WISH YOU COULD LIE DOWN: VERY OFTEN
HOW OFTEN HAVE YOU FELT TOO TIRED TO WORK BECAUSE OF YOUR HEADACHES: SOMETIMES
HOW OFTEN DO HEADACHES LIMIT YOUR DAILY ACTIVITIES: SOMETIMES
HIT6 TOTAL SCORE: 62
HOW OFTEN HAVE YOU FELT FED UP OR IRRITATED BECAUSE OF YOUR HEADACHES: SOMETIMES
HOW OFTEN DID HEADACHS LIMIT CONCENTRATION ON WORK OR DAILY ACTIVITY: SOMETIMES
HOW OFTEN DO HEADACHES LIMIT YOUR DAILY ACTIVITIES: SOMETIMES

## 2019-11-20 ASSESSMENT — PAIN SCALES - GENERAL: PAINLEVEL: NO PAIN (0)

## 2019-11-20 NOTE — LETTER
11/20/2019       RE: Carito Varela  [address] [facility] [address]  [place] [address]     Dear Colleague,    Thank you for referring your patient, Carito Varela, to the [facility] PHYSICAL MEDICINE AND REHABILITATION at [facility]. Please see a copy of my visit note below.    BOTULINUM TOXIN PROCEDURE - HEADACHE - NOTE    Chief Complaint   Patient presents with     Headache     UMP BOTOX- STANDARD MIGRAINE      /72   Pulse 75   Resp 16   Wt 58.5 kg (129 lb)   SpO2 98%   BMI 20.82 kg/m        Current Outpatient Medications:      aspirin-acetaminophen-caffeine (EXCEDRIN MIGRAINE) 250-250-65 MG tablet, Take 2 tablets by mouth every 6 hours as needed , Disp: , Rfl:      COENZYME Q-10 PO, Take 300 mg by mouth daily, Disp: , Rfl:      norethindrone-ethinyl estradiol (ORTHO-NOVUM 7/7/7) 0.5/0.75/1-35 MG-MCG per tablet, Take 1 tablet by mouth daily, Disp: , Rfl:      SPIRONOLACTONE PO, Take 50 mg by mouth daily , Disp: , Rfl:      topiramate (TOPAMAX) 25 MG tablet, Two at night, Disp: 180 tablet, Rfl: 3     venlafaxine (EFFEXOR-XR) 37.5 MG 24 hr capsule, Take 37.5 mg by mouth daily, Disp: , Rfl:      vitamin D3 (CHOLECALCIFEROL) 2000 units tablet, Take 1 tablet by mouth daily, Disp: , Rfl:      gabapentin (NEURONTIN) 100 MG capsule, Take 1 capsule (100 mg) by mouth 3 times daily (Patient not taking: Reported on 8/14/2019), Disp: 90 capsule, Rfl: 3     naratriptan (AMERGE) 1 MG tablet, Take 1 tablet (1 mg) by mouth at onset of headache for migraine (REPEAT DOSE IN 4 HOURS) May repeat in 4 hours. Max 5 tablets/24 hours. (Patient not taking: Reported on 11/20/2019), Disp: 18 tablet, Rfl: 3     Allergies   Allergen Reactions     Zofran [Ondansetron] Hives     Macrobid [Nitrofurantoin] Hives and Other (See Comments)     PN: joint pain        PHYSICAL EXAM:    Denies migraine headache today.  Last migraine was Sunday, rated 10/10, took Excedrin Migraine with some  benefit.    HPI:    Patient denies new medical diagnoses, illnesses, hospitalizations, emergency room visits, and injuries since the previous injection with botulinum neurotoxin.    We reviewed the recommended safety guidelines for  Botox from any vaccine injection, such as the seasonal flu vaccine, by a minimum of 10-14 days with Carito Varela. She acknowledged understanding.    RESPONSE TO PREVIOUS TREATMENT:  Change in headache pattern following last series of injections with 150 units of  Botox on 2019.    Stiffness:  Rated as 'Moderate' severity.  Duration: 1 week resolved  Post-procedural headache: Rated as 'Severe' severity.  Duration: 3 days resolved    1.  Headache frequency during this injection cycle:  4 headache days per month.  This is compared to her baseline headache frequency of 25 headache days per month.     2.  Headache duration during this injection cycle:  Headache duration ranged from 3 hours to 5 days. Patient reports no episodes of multiple day headaches during this injection cycle.     3.  Headache intensity during this injection cycle:    A.  7-8/10  =  Typical pain level.  B.  10/10  =  Worst pain level.  C.  0/10  =  Lowest pain level.    4.  Change in headache medication usage during this injection cycle:  (For Example:  Able to decrease use of oral pain medications.) Decreased use of pain meds.    5.  ER Visits During This Injection Cycle:  0    6.  Functional Performance:  Change in ADL's, social interaction, days lost from work, etc. Unable to function with headache 10/10.      BOTULINUM NEUROTOXIN INJECTION PROCEDURES:    VERIFICATION OF PATIENT IDENTIFICATION AND PROCEDURE     Initials   Patient Name lmd   Patient  lmd   Procedure Verified by: lmd     Prior to the start of the procedure and with procedural staff participation, I verbally confirmed the patient s identity using two indicators, relevant allergies, that the procedure was appropriate and matched the  consent or emergent situation, and that the correct equipment/implants were available. Immediately prior to starting the procedure I conducted the Time Out with the procedural staff and re-confirmed the patient s name, procedure, and site/side. (The Joint Commission universal protocol was followed.)  Yes    Sedation (Moderate or Deep): None    Above assessments performed by:    Emma Valero RN Care Coordinator    Kaitlin Bardales MD      INDICATIONS FOR PROCEDURES:  Carito Varela is a 24 year old patient with severe chronic migraine headaches.    Her baseline symptoms have been recalcitrant to oral medications and conservative therapy.  She is here today for reinjection with Botox.    GOAL OF PROCEDURE:  The goal of this procedure is to decrease pain .    TOTAL DOSE ADMINISTERED:  Dose Administered:  150 units  Botox (Botulinum Toxin Type A)       2:1 Dilution   Diluent Used:  Preservative Free Normal Saline  Total Volume of Diluent Used:  3.0 ml  Lot # /C3 with Expiration Date:  5/2022  NDC #: Botox 100u ([address]-67)    Was there drug waste? Yes  Amount of drug waste (mL): 50 units Botox.  Reason for waste:  Single use vial  Multi-dose vial: No    Kaitlin Bardales MD  November 20, 2019     Medication guide was offered to patient and was declined.    CONSENT:  The risks, benefits, and treatment options were discussed with Carito Varela and she agreed to proceed.    Written consent was obtained by lmd.      EQUIPMENT USED:  Needle-30 gauge    SKIN PREPARATION:  Skin preparation was performed using an alcohol wipe.     GUIDANCE DESCRIPTION:  Guidance was not utilized for this procedure     AREA/MUSCLE INJECTED:  Procedure Details: From lot number [id number], NDC number [address]-98, expiration date 0 5/2022, 200 units of onabotulinumtoxinA was diluted in 4 mL preservative free normal saline. A total of 150 units of onabotulinumtoxinA were injected using 30 gauge 0.5 in needles into the muscles  listed below. 50 units of onabotulinumtoxinA were wasted.      Injection Sites: Total = 150 units onabotulinumtoxinA       and Procerus muscles - 5 units into the left and right corrugators and 5 units into the procerus (15 units total)     Frontalis muscles - 5 units into the left superior frontalis and 5 unites into the right superior frontalis (2 injection sites per muscle) (10 units total)     Temporalis muscles - 12.5 units into the left temporalis muscle and 12.5 units into the right temporalis muscle (2 injection sites per muscle) (25 units total)     Occipitalis muscles - 12.5 units into the left occipitalis muscle and 12.5 units into the right occipitalis muscle (2 injection sites per muscle) (25 units total)     Splenius Capitis muscles - 12.5 units into the left splenius capitis muscle and 12.5 units into the right splenius capitis muscle (2 injection sites per muscle, divided into 2/3 anteriorly and 1/3 posteriorly) (25 units total)                 Trapezius muscles - 25 units into the left trapezius muscle and 25 units into the right trapezius muscle (3 injection sites per muscle, divided 5 units, 10 units, 10 units, medial to             lateral) (50 units total)    RESPONSE TO PROCEDURE:  Carito Varela tolerated the procedure well and there were no immediate complications.   She was allowed to recover for an appropriate period of time and was discharged home in stable condition.    FOLLOW UP:  Carito Varela was asked to follow up by phone in 7-14 days with Mily Freeman PT, Care Coordinator or Emma Bruner RN, Care Coordinator, to report her response to this series of injections.     PLAN (Medication Changes, Therapy Orders, Work or Disability Issues, etc.): Patient will continue to monitor response to today's injections.    Again, thank you for allowing me to participate in the care of your patient.      Sincerely,    Kaitlin Bardales MD

## 2019-11-20 NOTE — PROGRESS NOTES
BOTULINUM TOXIN PROCEDURE - HEADACHE - NOTE    Chief Complaint   Patient presents with     Headache     UMP BOTOX- STANDARD MIGRAINE      /72   Pulse 75   Resp 16   Wt 58.5 kg (129 lb)   SpO2 98%   BMI 20.82 kg/m       Current Outpatient Medications:      aspirin-acetaminophen-caffeine (EXCEDRIN MIGRAINE) 250-250-65 MG tablet, Take 2 tablets by mouth every 6 hours as needed , Disp: , Rfl:      COENZYME Q-10 PO, Take 300 mg by mouth daily, Disp: , Rfl:      norethindrone-ethinyl estradiol (ORTHO-NOVUM 7/7/7) 0.5/0.75/1-35 MG-MCG per tablet, Take 1 tablet by mouth daily, Disp: , Rfl:      SPIRONOLACTONE PO, Take 50 mg by mouth daily , Disp: , Rfl:      topiramate (TOPAMAX) 25 MG tablet, Two at night, Disp: 180 tablet, Rfl: 3     venlafaxine (EFFEXOR-XR) 37.5 MG 24 hr capsule, Take 37.5 mg by mouth daily, Disp: , Rfl:      vitamin D3 (CHOLECALCIFEROL) 2000 units tablet, Take 1 tablet by mouth daily, Disp: , Rfl:      gabapentin (NEURONTIN) 100 MG capsule, Take 1 capsule (100 mg) by mouth 3 times daily (Patient not taking: Reported on 8/14/2019), Disp: 90 capsule, Rfl: 3     naratriptan (AMERGE) 1 MG tablet, Take 1 tablet (1 mg) by mouth at onset of headache for migraine (REPEAT DOSE IN 4 HOURS) May repeat in 4 hours. Max 5 tablets/24 hours. (Patient not taking: Reported on 11/20/2019), Disp: 18 tablet, Rfl: 3     Allergies   Allergen Reactions     Zofran [Ondansetron] Hives     Macrobid [Nitrofurantoin] Hives and Other (See Comments)     PN: joint pain        PHYSICAL EXAM:    Denies migraine headache today.  Last migraine was Sunday, rated 10/10, took Excedrin Migraine with some benefit.    HPI:    Patient denies new medical diagnoses, illnesses, hospitalizations, emergency room visits, and injuries since the previous injection with botulinum neurotoxin.    We reviewed the recommended safety guidelines for  Botox from any vaccine injection, such as the seasonal flu vaccine, by a minimum of 10-14  days with Carito Varela. She acknowledged understanding.    RESPONSE TO PREVIOUS TREATMENT:  Change in headache pattern following last series of injections with 150 units of  Botox on 2019.    Stiffness:  Rated as 'Moderate' severity.  Duration: 1 week resolved  Post-procedural headache: Rated as 'Severe' severity.  Duration: 3 days resolved    1.  Headache frequency during this injection cycle:  4 headache days per month.  This is compared to her baseline headache frequency of 25 headache days per month.     2.  Headache duration during this injection cycle:  Headache duration ranged from 3 hours to 5 days. Patient reports no episodes of multiple day headaches during this injection cycle.     3.  Headache intensity during this injection cycle:    A.  7-8/10  =  Typical pain level.  B.  10/10  =  Worst pain level.  C.  0/10  =  Lowest pain level.    4.  Change in headache medication usage during this injection cycle:  (For Example:  Able to decrease use of oral pain medications.) Decreased use of pain meds.    5.  ER Visits During This Injection Cycle:  0    6.  Functional Performance:  Change in ADL's, social interaction, days lost from work, etc. Unable to function with headache 10/10.      BOTULINUM NEUROTOXIN INJECTION PROCEDURES:    VERIFICATION OF PATIENT IDENTIFICATION AND PROCEDURE     Initials   Patient Name lmd   Patient  lmd   Procedure Verified by: lmd     Prior to the start of the procedure and with procedural staff participation, I verbally confirmed the patient s identity using two indicators, relevant allergies, that the procedure was appropriate and matched the consent or emergent situation, and that the correct equipment/implants were available. Immediately prior to starting the procedure I conducted the Time Out with the procedural staff and re-confirmed the patient s name, procedure, and site/side. (The Joint Commission universal protocol was followed.)  Yes    Sedation (Moderate or  Deep): None    Above assessments performed by:    Emma Valero RN Care Coordinator    Kaitlin Bardales MD      INDICATIONS FOR PROCEDURES:  Carito Varela is a 24 year old patient with severe chronic migraine headaches.    Her baseline symptoms have been recalcitrant to oral medications and conservative therapy.  She is here today for reinjection with Botox.    GOAL OF PROCEDURE:  The goal of this procedure is to decrease pain .    TOTAL DOSE ADMINISTERED:  Dose Administered:  150 units  Botox (Botulinum Toxin Type A)       2:1 Dilution   Diluent Used:  Preservative Free Normal Saline  Total Volume of Diluent Used:  3.0 ml  Lot # /C3 with Expiration Date:  5/2022  NDC #: Botox 100u ([address]-61)    Was there drug waste? Yes  Amount of drug waste (mL): 50 units Botox.  Reason for waste:  Single use vial  Multi-dose vial: No    Kaitlin Bardales MD  November 20, 2019     Medication guide was offered to patient and was declined.    CONSENT:  The risks, benefits, and treatment options were discussed with Carito Varela and she agreed to proceed.    Written consent was obtained by lmd.      EQUIPMENT USED:  Needle-30 gauge    SKIN PREPARATION:  Skin preparation was performed using an alcohol wipe.     GUIDANCE DESCRIPTION:  Guidance was not utilized for this procedure     AREA/MUSCLE INJECTED:  Procedure Details: From lot number /[id number], NDC number [id number], expiration date 05/2022, 200 units of onabotulinumtoxinA was diluted in 4 mL preservative free normal saline. A total of 150 units of onabotulinumtoxinA were injected using 30 gauge 0.5 in needles into the muscles listed below. 50 units of onabotulinumtoxinA were wasted.      Injection Sites: Total = 150 units onabotulinumtoxinA       and Procerus muscles - 5 units into the left and right corrugators and 5 units into the procerus (15 units total)     Frontalis muscles - 5 units into the left superior frontalis and 5 unites into the  right superior frontalis (2 injection sites per muscle) (10 units total)     Temporalis muscles - 12.5 units into the left temporalis muscle and 12.5 units into the right temporalis muscle (2 injection sites per muscle) (25 units total)     Occipitalis muscles - 12.5 units into the left occipitalis muscle and 12.5 units into the right occipitalis muscle (2 injection sites per muscle) (25 units total)     Splenius Capitis muscles - 12.5 units into the left splenius capitis muscle and 12.5 units into the right splenius capitis muscle (2 injection sites per muscle, divided into 2/3 anteriorly and 1/3 posteriorly) (25 units total)                 Trapezius muscles - 25 units into the left trapezius muscle and 25 units into the right trapezius muscle (3 injection sites per muscle, divided 5 units, 10 units, 10 units, medial to             lateral) (50 units total)    RESPONSE TO PROCEDURE:  Carito Varela tolerated the procedure well and there were no immediate complications.   She was allowed to recover for an appropriate period of time and was discharged home in stable condition.    FOLLOW UP:  Carito Varela was asked to follow up by phone in 7-14 days with Mily Freeman PT, Care Coordinator or Emma Bruner RN, Care Coordinator, to report her response to this series of injections.     PLAN (Medication Changes, Therapy Orders, Work or Disability Issues, etc.): Patient will continue to monitor response to today's injections.

## 2019-11-20 NOTE — NURSING NOTE
Chief Complaint   Patient presents with     Headache     [facility] BOTOX- STANDARD MIGRAINE        Silvino Gramajo, EMT

## 2020-02-11 ENCOUNTER — TELEPHONE (OUTPATIENT)
Dept: NEUROLOGY | Facility: CLINIC | Age: 25
End: 2020-02-11

## 2020-02-11 NOTE — TELEPHONE ENCOUNTER
----- Message from Caryn Garza sent at 2/11/2020  1:09 PM CST -----  Dae Mcpherson,    This patient is good to go, she can receive up to 200 units every 84 days.    Hope you're having a great day,    Caryn  ----- Message -----  From: Vida Fairchild RN  Sent: 2/10/2020   7:12 AM CST  To: [facility]    Patient has had a change in insurance.  She would like to know if botox will continued to be covered under [employer].    Thank you for your help!  Vida

## 2020-02-11 NOTE — TELEPHONE ENCOUNTER
Called and left a voice message that her botox has been approved and that I would have one of our schedulers call her to help arrange this appointment.

## 2020-02-17 ENCOUNTER — OFFICE VISIT (OUTPATIENT)
Dept: NEUROLOGY | Facility: CLINIC | Age: 25
End: 2020-02-17
Payer: COMMERCIAL

## 2020-02-17 DIAGNOSIS — G43.709 CHRONIC MIGRAINE WITHOUT AURA WITHOUT STATUS MIGRAINOSUS, NOT INTRACTABLE: Primary | ICD-10-CM

## 2020-02-17 NOTE — PROGRESS NOTES
[place], [place]  Botulinum Toxin Procedure    Abril Salazar MD  Neurology    February 17, 2020    Procedure:  OnabotulinumtoxinA injections for chronic migraine  Indication:  Chronic migraine    Ms. Varela suffers from severe intractable headaches.  She was referred by Dr. Salazar for onabotulinumtoxinA injections for headache.  Risks, benefits, and alternatives were discussed.  All questions were answered and consent given.  She decided to proceed with the injections.     Prior to initiation of botulinum toxin injections, Ms. Varela reported 15-20 headache days per month, with 15-20 severe headache days per month. Her headaches are quite disabling and often interfere with her ability to function normally.    Ms. Varela reports 2 headache days per month currently, with 2 severe headache days per month.  She has noticed a wearing off phenomenon prior to this round of botulinum toxin injections, lasting 2 weeks.    She has attempted other migraine prophylactic treatments in the past, which have included: Unfortunately, she has not had a good response with maximized doses of topiramate, venlafaxine, and gabapentin.  She is also tried nortriptyline in the past which caused side effects and was stopped.  She currently takes Spironolactone for other reasons, and this is not been helpful for headache.  She has a history of dizziness, and I did not recommend a beta-blocker out of fear of worsening this.  She also takes co-Q10 supplements, which are not clearly effective.       She currently takes CoQ10, topiramate, venlafaxine for headache prevention. Plan made to decrease venlafaxine to off over the next few months.    Ms. Varela's pain was assessed prior to the procedure.  She rated her pain today as 0 out of 10.    Procedural Pause: Procedural pause was conducted to verify correct patient identity, procedure to be performed, correct side and site, correct patient position, and special  requirements. Appropriate hand hygiene was utilized, and each injection site was prepped with alcohol wipes or Chloraprep swab.     Procedure Details: From lot number [id number], expiration date June 2022, 200 units of onabotulinumtoxinA was diluted in 4 mL 0.9% normal saline, lot #[id number], expiration date March 1, 2021. A total of 150 units of onabotulinumtoxinA were injected using 30 gauge 0.5 in needles into the muscles listed below. 50 units of onabotulinumtoxinA were wasted.     Injection Sites: Total = 150 units onabotulinumtoxinA      and Procerus muscles - 5 units into the left and right corrugators and 5 units into the procerus (15 units total)    Frontalis muscles - 5 units into the left superior frontalis and 5 unites into the right superior frontalis (2 injection sites per muscle) (10 units total)    Temporalis muscles - 12.5 units into the left temporalis muscle and 12.5 units into the right temporalis muscle (2 injection sites per muscle) (25 units total)    Occipitalis muscles - 12.5 units into the left occipitalis muscle and 12.5 units into the right occipitalis muscle (2 injection sites per muscle) (25 units total)    Splenius Capitis muscles - 12.5 units into the left splenius capitis muscle and 12.5 units into the right splenius capitis muscle (2 injection sites per muscle, divided into 2/3 anteriorly and 1/3 posteriorly) (25 units total)      Trapezius muscles - 25 units into the left trapezius muscle and 25 units into the right trapezius muscle (3 injection sites per muscle, divided 5 units, 10 units, 10 units, medial to  lateral) (50 units total)    Ms. Varela tolerated the procedure well without immediate complications.  She will follow up in clinic for assessment of the effectiveness of treatment.  She did not report any change in her pain level after the botulinumtoxinA injection procedure.    Abril Salazar MD  Pager: [phone]    Neurology Department  [facility]  [facility]  [address] [place] [id number]

## 2020-02-17 NOTE — LETTER
2/17/2020       RE: Carito Varela  [address]  [facility] [address]     Dear Colleague,    Thank you for referring your patient, Carito Varela, to the [facility] NEUROLOGY at [facility]. Please see a copy of my visit note below.    [place], [place]  Botulinum Toxin Procedure    Abril Salazar MD  Neurology    February 17, 2020    Procedure:  OnabotulinumtoxinA injections for chronic migraine  Indication:  Chronic migraine    Ms. Varela suffers from severe intractable headaches.  She was referred by Dr. Salazar for onabotulinumtoxinA injections for headache.  Risks, benefits, and alternatives were discussed.  All questions were answered and consent given.  She decided to proceed with the injections.     Prior to initiation of botulinum toxin injections, Ms. Varela reported 15-20 headache days per month, with 15-20 severe headache days per month. Her headaches are quite disabling and often interfere with her ability to function normally.    Ms. Varela reports 2 headache days per month currently, with 2 severe headache days per month.  She has noticed a wearing off phenomenon prior to this round of botulinum toxin injections, lasting 2 weeks.    She has attempted other migraine prophylactic treatments in the past, which have included: Unfortunately, she has not had a good response with maximized doses of topiramate, venlafaxine, and gabapentin.  She is also tried nortriptyline in the past which caused side effects and was stopped.  She currently takes Spironolactone for other reasons, and this is not been helpful for headache.  She has a history of dizziness, and I did not recommend a beta-blocker out of fear of worsening this.  She also takes co-Q10 supplements, which are not clearly effective.       She currently takes CoQ10, topiramate, venlafaxine for headache prevention. Plan made to decrease venlafaxine to off over the next few  months.    Ms. Varela's pain was assessed prior to the procedure.  She rated her pain today as 0 out of 10.    Procedural Pause: Procedural pause was conducted to verify correct patient identity, procedure to be performed, correct side and site, correct patient position, and special requirements. Appropriate hand hygiene was utilized, and each injection site was prepped with alcohol wipes or Chloraprep swab.     Procedure Details: From lot number [id number], expiration date June 2022, 200 units of onabotulinumtoxinA was diluted in 4 mL 0.9% normal saline, lot #[id number], expiration date March 1, 2021. A total of 150 units of onabotulinumtoxinA were injected using 30 gauge 0.5 in needles into the muscles listed below. 50 units of onabotulinumtoxinA were wasted.     Injection Sites: Total = 150 units onabotulinumtoxinA      and Procerus muscles - 5 units into the left and right corrugators and 5 units into the procerus (15 units total)    Frontalis muscles - 5 units into the left superior frontalis and 5 unites into the right superior frontalis (2 injection sites per muscle) (10 units total)    Temporalis muscles - 12.5 units into the left temporalis muscle and 12.5 units into the right temporalis muscle (2 injection sites per muscle) (25 units total)    Occipitalis muscles - 12.5 units into the left occipitalis muscle and 12.5 units into the right occipitalis muscle (2 injection sites per muscle) (25 units total)    Splenius Capitis muscles - 12.5 units into the left splenius capitis muscle and 12.5 units into the right splenius capitis muscle (2 injection sites per muscle, divided into 2/3 anteriorly and 1/3 posteriorly) (25 units total)      Trapezius muscles - 25 units into the left trapezius muscle and 25 units into the right trapezius muscle (3 injection sites per muscle, divided 5 units, 10 units, 10 units, medial to  lateral) (50 units total)    Ms. Varela tolerated the procedure well without immediate  complications.  She will follow up in clinic for assessment of the effectiveness of treatment.  She did not report any change in her pain level after the botulinumtoxinA injection procedure.    Abril Salazar MD  Pager: [phone]    Neurology Department  [facility] [address] [address]

## 2020-05-08 ENCOUNTER — TELEPHONE (OUTPATIENT)
Dept: NEUROLOGY | Facility: CLINIC | Age: 25
End: 2020-05-08

## 2020-05-15 ENCOUNTER — OFFICE VISIT (OUTPATIENT)
Dept: NEUROLOGY | Facility: CLINIC | Age: 25
End: 2020-05-15
Payer: COMMERCIAL

## 2020-05-15 DIAGNOSIS — G43.709 CHRONIC MIGRAINE WITHOUT AURA WITHOUT STATUS MIGRAINOSUS, NOT INTRACTABLE: Primary | ICD-10-CM

## 2020-05-15 NOTE — LETTER
5/15/2020       RE: Carito Varela  [address]  [facility] [address]     Dear Colleague,    Thank you for referring your patient, Carito Varela, to the [facility] NEUROLOGY at [facility]. Please see a copy of my visit note below.    [place], [place]  Botulinum Toxin Procedure    Abril Salazar MD  Neurology    May 15, 2020    Procedure:  OnabotulinumtoxinA injections for chronic migraine  Indication:  Chronic migraine    Ms. Varela suffers from severe intractable headaches.  She was referred by Dr. Salazar for onabotulinumtoxinA injections for headache.  Risks, benefits, and alternatives were discussed.  All questions were answered and consent given.  She decided to proceed with the injections.     Prior to initiation of botulinum toxin injections, Ms. Varela reported 15-20 headache days per month, with 15-20 severe headache days per month. Her headaches are quite disabling and often interfere with her ability to function normally.    Ms. Varela reports 8 headache days per month currently, with 4 severe headache days per month.  She has noticed a wearing off phenomenon prior to this round of botulinum toxin injections, lasting 2 weeks.    She has attempted other migraine prophylactic treatments in the past, which have included: Unfortunately, she has not had a good response with maximized doses of topiramate, venlafaxine, and gabapentin.  She is also tried nortriptyline in the past which caused side effects and was stopped.  She currently takes Spironolactone for other reasons, and this is not been helpful for headache.  She has a history of dizziness, and I did not recommend a beta-blocker out of fear of worsening this.  She also takes co-Q10 supplements, which are not clearly effective.        She currently takes CoQ10, topiramate for headache prevention. Plan made to decrease venlafaxine to off over the next few months.    Ms.  Nichole's pain was assessed prior to the procedure.  She rated her pain today as 3 out of 10.    Procedural Pause: Procedural pause was conducted to verify correct patient identity, procedure to be performed, correct side and site, correct patient position, and special requirements. Appropriate hand hygiene was utilized, and each injection site was prepped with alcohol wipes or Chloraprep swab.     Procedure Details: From lot number [id number], expiration date 10/2022, 200 units of onabotulinumtoxinA was diluted in 4 mL 0.9% normal saline, lot # -[id number]. A total of 150 units of onabotulinumtoxinA were injected using 30 gauge 0.5 in needles into the muscles listed below. 50 units of onabotulinumtoxinA were wasted.     Injection Sites: Total = 150 units onabotulinumtoxinA      and Procerus muscles - 5 units into the left and right corrugators and 5 units into the procerus (15 units total)    Frontalis muscles - 5 units into the left superior frontalis and 5 unites into the right superior frontalis (2 injection sites per muscle) (10 units total)    Temporalis muscles - 12.5 units into the left temporalis muscle and 12.5 units into the right temporalis muscle (2 injection sites per muscle) (25 units total)    Occipitalis muscles - 12.5 units into the left occipitalis muscle and 12.5 units into the right occipitalis muscle (2 injection sites per muscle) (25 units total)    Splenius Capitis muscles - 12.5 units into the left splenius capitis muscle and 12.5 units into the right splenius capitis muscle (2 injection sites per muscle, divided into 2/3 anteriorly and 1/3 posteriorly) (25 units total) **Injected all unit into the anterior portion of muscle, per patient preference      Trapezius muscles - 25 units into the left trapezius muscle and 25 units into the right trapezius muscle (3 injection sites per muscle, divided 5 units, 10 units, 10 units, medial to  lateral) (50 units total)    Ms. Varela tolerated the  procedure well without immediate complications.  She will follow up in clinic for assessment of the effectiveness of treatment.  She did not report any change in her pain level after the botulinumtoxinA injection procedure.    Abril Salazar MD  Pager: [phone]    Neurology Department  [facility] [address] [address]      Again, thank you for allowing me to participate in the care of your patient.      Sincerely,    Abril Salazar MD

## 2020-05-15 NOTE — PROGRESS NOTES
[place], [place]  Botulinum Toxin Procedure    Abril Salazar MD  Neurology    May 15, 2020    Procedure:  OnabotulinumtoxinA injections for chronic migraine  Indication:  Chronic migraine    Ms. Varela suffers from severe intractable headaches.  She was referred by Dr. Salazar for onabotulinumtoxinA injections for headache.  Risks, benefits, and alternatives were discussed.  All questions were answered and consent given.  She decided to proceed with the injections.     Prior to initiation of botulinum toxin injections, Ms. Varela reported 15-20 headache days per month, with 15-20 severe headache days per month. Her headaches are quite disabling and often interfere with her ability to function normally.    Ms. Varela reports 8 headache days per month currently, with 4 severe headache days per month.  She has noticed a wearing off phenomenon prior to this round of botulinum toxin injections, lasting 2 weeks.    She has attempted other migraine prophylactic treatments in the past, which have included: Unfortunately, she has not had a good response with maximized doses of topiramate, venlafaxine, and gabapentin.  She is also tried nortriptyline in the past which caused side effects and was stopped.  She currently takes Spironolactone for other reasons, and this is not been helpful for headache.  She has a history of dizziness, and I did not recommend a beta-blocker out of fear of worsening this.  She also takes co-Q10 supplements, which are not clearly effective.        She currently takes CoQ10, topiramate for headache prevention. Plan made to decrease venlafaxine to off over the next few months.    Ms. Varela's pain was assessed prior to the procedure.  She rated her pain today as 3 out of 10.    Procedural Pause: Procedural pause was conducted to verify correct patient identity, procedure to be performed, correct side and site, correct patient position, and special requirements. Appropriate  hand hygiene was utilized, and each injection site was prepped with alcohol wipes or Chloraprep swab.     Procedure Details: From lot number [id number], expiration date 10/2022, 200 units of onabotulinumtoxinA was diluted in 4 mL 0.9% normal saline, lot # -[id number]. A total of 150 units of onabotulinumtoxinA were injected using 30 gauge 0.5 in needles into the muscles listed below. 50 units of onabotulinumtoxinA were wasted.     Injection Sites: Total = 150 units onabotulinumtoxinA      and Procerus muscles - 5 units into the left and right corrugators and 5 units into the procerus (15 units total)    Frontalis muscles - 5 units into the left superior frontalis and 5 unites into the right superior frontalis (2 injection sites per muscle) (10 units total)    Temporalis muscles - 12.5 units into the left temporalis muscle and 12.5 units into the right temporalis muscle (2 injection sites per muscle) (25 units total)    Occipitalis muscles - 12.5 units into the left occipitalis muscle and 12.5 units into the right occipitalis muscle (2 injection sites per muscle) (25 units total)    Splenius Capitis muscles - 12.5 units into the left splenius capitis muscle and 12.5 units into the right splenius capitis muscle (2 injection sites per muscle, divided into 2/3 anteriorly and 1/3 posteriorly) (25 units total) **Injected all unit into the anterior portion of muscle, per patient preference      Trapezius muscles - 25 units into the left trapezius muscle and 25 units into the right trapezius muscle (3 injection sites per muscle, divided 5 units, 10 units, 10 units, medial to  lateral) (50 units total)    Ms. Varela tolerated the procedure well without immediate complications.  She will follow up in clinic for assessment of the effectiveness of treatment.  She did not report any change in her pain level after the botulinumtoxinA injection procedure.    Abril Salazar MD  Pager: [phone]    Neurology  Department  [facility]  [address] [place] [id number]

## 2020-08-07 ENCOUNTER — OFFICE VISIT (OUTPATIENT)
Dept: NEUROLOGY | Facility: CLINIC | Age: 25
End: 2020-08-07
Payer: COMMERCIAL

## 2020-08-07 DIAGNOSIS — G43.709 CHRONIC MIGRAINE WITHOUT AURA WITHOUT STATUS MIGRAINOSUS, NOT INTRACTABLE: Primary | ICD-10-CM

## 2020-08-07 NOTE — LETTER
8/7/2020       RE: Carito Varela  [address]  [facility] [address]     Dear Colleague,    Thank you for referring your patient, Carito Varela, to the [facility] NEUROLOGY at [facility]. Please see a copy of my visit note below.    [place], [place]  Botulinum Toxin Procedure    Abril Salazar MD  Neurology    August 7, 2020    Procedure:  OnabotulinumtoxinA injections for chronic migraine  Indication:  Chronic migraine    Ms. Varela suffers from severe intractable headaches.  She was referred by Dr. Salazar for onabotulinumtoxinA injections for headache.  Risks, benefits, and alternatives were discussed.  All questions were answered and consent given.  She decided to proceed with the injections.     Prior to initiation of botulinum toxin injections, Ms. Varela reported 15-20 headache days per month, with 15-20 severe headache days per month. Her headaches are quite disabling and often interfere with her ability to function normally.    Ms. Varela reports 2 headache days per month currently, with 2 severe headache days per month.  She has noticed a wearing off phenomenon prior to this round of botulinum toxin injections, lasting 2 weeks.    She has attempted other migraine prophylactic treatments in the past, which have included:  topiramate, venlafaxine, and gabapentin.  She is also tried nortriptyline in the past which caused side effects and was stopped.  She currently takes Spironolactone for other reasons, and this is not been helpful for headache.  She has a history of dizziness, and I did not recommend a beta-blocker out of fear of worsening this.  She also takes co-Q10 supplements, which are not clearly effective.        She currently takes CoQ10, topiramate for headache prevention.    Ms. Varela's pain was assessed prior to the procedure.  She rated her pain today as 2 out of 10.    Procedural Pause: Procedural pause was  conducted to verify correct patient identity, procedure to be performed, correct side and site, correct patient position, and special requirements. Appropriate hand hygiene was utilized, and each injection site was prepped with alcohol wipes or Chloraprep swab.     Procedure Details: From lot number [id number], expiration date March 2023, 200 units of onabotulinumtoxinA was diluted in 4 mL 0.9% normal saline, lot #[id number]. A total of 150 units of onabotulinumtoxinA were injected using 30 gauge 0.5 in needles into the muscles listed below. 50 units of onabotulinumtoxinA were wasted.     Injection Sites: Total = 150 units onabotulinumtoxinA      and Procerus muscles - 5 units into the left and right corrugators and 5 units into the procerus (15 units total)    Frontalis muscles - 5 units into the left superior frontalis and 5 units into the right superior frontalis (2 injection sites per muscle) (10 units total)    Temporalis muscles - 12.5 units into the left temporalis muscle and 12.5 units into the right temporalis muscle (2 injection sites per muscle) (25 units total)    Occipitalis muscles - 12.5 units into the left occipitalis muscle and 12.5 units into the right occipitalis muscle (2 injection sites per muscle) (25 units total)    Splenius Capitis muscles - 12.5 units into the left splenius capitis muscle and 12.5 units into the right splenius capitis muscle (2 injection sites per muscle, divided into 2/3 anteriorly and 1/3 posteriorly) (25 units total)   **Injected all units into the anterior portion of muscle, per patient preference**      Trapezius muscles - 25 units into the left trapezius muscle and 25 units into the right trapezius muscle (3 injection sites per muscle, divided 5 units, 10 units, 10 units, medial to lateral) (50 units total)    Ms. Varela tolerated the procedure well without immediate complications.  She will follow up in clinic for assessment of the effectiveness of treatment.  She  did not report any change in her pain level after the botulinumtoxinA injection procedure.    Abril Salazar MD  Pager: [phone]    Neurology Department  [facility] [place]  [address] [place] [id number]         03-Aug-2017 07:21

## 2020-08-07 NOTE — PROGRESS NOTES
[place], [place]  Botulinum Toxin Procedure    Abril Salazar MD  Neurology    August 7, 2020    Procedure:  OnabotulinumtoxinA injections for chronic migraine  Indication:  Chronic migraine    Ms. Varela suffers from severe intractable headaches.  She was referred by Dr. Salazar for onabotulinumtoxinA injections for headache.  Risks, benefits, and alternatives were discussed.  All questions were answered and consent given.  She decided to proceed with the injections.     Prior to initiation of botulinum toxin injections, Ms. Varela reported 15-20 headache days per month, with 15-20 severe headache days per month. Her headaches are quite disabling and often interfere with her ability to function normally.    Ms. Varela reports 2 headache days per month currently, with 2 severe headache days per month.  She has noticed a wearing off phenomenon prior to this round of botulinum toxin injections, lasting 2 weeks.    She has attempted other migraine prophylactic treatments in the past, which have included:  topiramate, venlafaxine, and gabapentin.  She is also tried nortriptyline in the past which caused side effects and was stopped.  She currently takes Spironolactone for other reasons, and this is not been helpful for headache.  She has a history of dizziness, and I did not recommend a beta-blocker out of fear of worsening this.  She also takes co-Q10 supplements, which are not clearly effective.        She currently takes CoQ10, topiramate for headache prevention.    Ms. Varela's pain was assessed prior to the procedure.  She rated her pain today as 2 out of 10.    Procedural Pause: Procedural pause was conducted to verify correct patient identity, procedure to be performed, correct side and site, correct patient position, and special requirements. Appropriate hand hygiene was utilized, and each injection site was prepped with alcohol wipes or Chloraprep swab.     Procedure Details: From lot  number [id number], expiration date March 2023, 200 units of onabotulinumtoxinA was diluted in 4 mL 0.9% normal saline, lot #[id number]. A total of 150 units of onabotulinumtoxinA were injected using 30 gauge 0.5 in needles into the muscles listed below. 50 units of onabotulinumtoxinA were wasted.     Injection Sites: Total = 150 units onabotulinumtoxinA      and Procerus muscles - 5 units into the left and right corrugators and 5 units into the procerus (15 units total)    Frontalis muscles - 5 units into the left superior frontalis and 5 units into the right superior frontalis (2 injection sites per muscle) (10 units total)    Temporalis muscles - 12.5 units into the left temporalis muscle and 12.5 units into the right temporalis muscle (2 injection sites per muscle) (25 units total)    Occipitalis muscles - 12.5 units into the left occipitalis muscle and 12.5 units into the right occipitalis muscle (2 injection sites per muscle) (25 units total)    Splenius Capitis muscles - 12.5 units into the left splenius capitis muscle and 12.5 units into the right splenius capitis muscle (2 injection sites per muscle, divided into 2/3 anteriorly and 1/3 posteriorly) (25 units total)   **Injected all units into the anterior portion of muscle, per patient preference**      Trapezius muscles - 25 units into the left trapezius muscle and 25 units into the right trapezius muscle (3 injection sites per muscle, divided 5 units, 10 units, 10 units, medial to lateral) (50 units total)    Ms. Varela tolerated the procedure well without immediate complications.  She will follow up in clinic for assessment of the effectiveness of treatment.  She did not report any change in her pain level after the botulinumtoxinA injection procedure.    Abril Salazar MD  Pager: [phone]    Neurology Department  [facility] [address] [address]

## 2020-10-05 DIAGNOSIS — G43.009 MIGRAINE WITHOUT AURA AND WITHOUT STATUS MIGRAINOSUS, NOT INTRACTABLE: ICD-10-CM

## 2020-10-05 RX ORDER — TOPIRAMATE 25 MG/1
TABLET, FILM COATED ORAL
Qty: 180 TABLET | Refills: 1 | Status: SHIPPED | OUTPATIENT
Start: 2020-10-05 | End: 2021-04-12

## 2020-10-30 ENCOUNTER — OFFICE VISIT (OUTPATIENT)
Dept: NEUROLOGY | Facility: CLINIC | Age: 25
End: 2020-10-30
Payer: COMMERCIAL

## 2020-10-30 DIAGNOSIS — G43.709 CHRONIC MIGRAINE WITHOUT AURA WITHOUT STATUS MIGRAINOSUS, NOT INTRACTABLE: Primary | ICD-10-CM

## 2020-10-30 PROCEDURE — 64615 CHEMODENERV MUSC MIGRAINE: CPT | Performed by: PSYCHIATRY & NEUROLOGY

## 2020-10-30 NOTE — PROGRESS NOTES
[place], [place]  Botulinum Toxin Procedure    Abril Salazar MD  Neurology    October 30, 2020    Procedure:  OnabotulinumtoxinA injections for chronic migraine  Indication:  Chronic migraine    Ms. Varela suffers from severe intractable headaches.  She was referred by Dr. Salazar for onabotulinumtoxinA injections for headache.  Risks, benefits, and alternatives were discussed.  All questions were answered and consent given.  She decided to proceed with the injections.     Prior to initiation of botulinum toxin injections, Ms. Varela reported 15-20 headache days per month, with 15-20 severe headache days per month. Her headaches are quite disabling and often interfere with her ability to function normally.    Ms. Varela reports 8 headache days per month currently, with 8 severe headache days per month.  She has noticed a wearing off phenomenon prior to this round of botulinum toxin injections, lasting 2 weeks. She has had more headaches lately, associated with increased stress.    She has attempted other migraine prophylactic treatments in the past, which have included:  topiramate, venlafaxine, and gabapentin.  She is also tried nortriptyline in the past which caused side effects and was stopped.  She currently takes Spironolactone for other reasons, and this is not been helpful for headache.  She has a history of dizziness, and I did not recommend a beta-blocker out of fear of worsening this.  She also takes co-Q10 supplements, which are not clearly effective.         She currently takes CoQ10, topiramate for headache prevention.    Ms. Varela's pain was assessed prior to the procedure.  She rated her pain today as 2-3 out of 10.    Procedural Pause: Procedural pause was conducted to verify correct patient identity, procedure to be performed, correct side and site, correct patient position, and special requirements. Appropriate hand hygiene was utilized, and each injection site was  prepped with alcohol wipes or Chloraprep swab.     Procedure Details: From lot number [id number], expiration date 6/2023, 200 units of onabotulinumtoxinA was diluted in 4 mL 0.9% normal saline, lot # [id number]. A total of 150 units of onabotulinumtoxinA were injected using 30 gauge 0.5 in needles into the muscles listed below. 50 units of onabotulinumtoxinA were wasted.     Injection Sites: Total = 150 units onabotulinumtoxinA      and Procerus muscles - 5 units into the left and right corrugators and 5 units into the procerus (15 units total)    Frontalis muscles - 5 units into the left superior frontalis and 5 units into the right superior frontalis (2 injection sites per muscle) (10 units total)    Temporalis muscles - 12.5 units into the left temporalis muscle and 12.5 units into the right temporalis muscle (2 injection sites per muscle) (25 units total)    Occipitalis muscles - 12.5 units into the left occipitalis muscle and 12.5 units into the right occipitalis muscle (2 injection sites per muscle) (25 units total)    Splenius Capitis muscles - 12.5 units into the left splenius capitis muscle and 12.5 units into the right splenius capitis muscle (2 injection sites per muscle, divided into 2/3 anteriorly and 1/3 posteriorly) (25 units total)      Trapezius muscles - 25 units into the left trapezius muscle and 25 units into the right trapezius muscle (3 injection sites per muscle, divided 5 units, 10 units, 10 units, medial to lateral) (50 units total)    Ms. Varela tolerated the procedure well without immediate complications.  She will follow up in clinic for assessment of the effectiveness of treatment.  She did not report any change in her pain level after the botulinumtoxinA injection procedure.    Abril Salazar MD  Pager: [phone]    Neurology Department  [facility] [address] [address]

## 2020-10-30 NOTE — LETTER
10/30/2020       RE: Carito Varela  [address] [facility] [address]  [facility] [address]     Dear Colleague,    Thank you for referring your patient, Carito Varela, to the [facility] NEUROLOGY CLINIC [place] at [facility]. Please see a copy of my visit note below.    [place], [place]  Botulinum Toxin Procedure    Abril Salazar MD  Neurology    October 30, 2020    Procedure:  OnabotulinumtoxinA injections for chronic migraine  Indication:  Chronic migraine    Ms. Varela suffers from severe intractable headaches.  She was referred by Dr. Salazar for onabotulinumtoxinA injections for headache.  Risks, benefits, and alternatives were discussed.  All questions were answered and consent given.  She decided to proceed with the injections.     Prior to initiation of botulinum toxin injections, Ms. Varela reported 15-20 headache days per month, with 15-20 severe headache days per month. Her headaches are quite disabling and often interfere with her ability to function normally.    Ms. Varela reports 8 headache days per month currently, with 8 severe headache days per month.  She has noticed a wearing off phenomenon prior to this round of botulinum toxin injections, lasting 2 weeks. She has had more headaches lately, associated with increased stress.    She has attempted other migraine prophylactic treatments in the past, which have included:  topiramate, venlafaxine, and gabapentin.  She is also tried nortriptyline in the past which caused side effects and was stopped.  She currently takes Spironolactone for other reasons, and this is not been helpful for headache.  She has a history of dizziness, and I did not recommend a beta-blocker out of fear of worsening this.  She also takes co-Q10 supplements, which are not clearly effective.         She currently takes CoQ10, topiramate for headache prevention.    Ms. Varela's pain was assessed prior to the  procedure.  She rated her pain today as 2-3 out of 10.    Procedural Pause: Procedural pause was conducted to verify correct patient identity, procedure to be performed, correct side and site, correct patient position, and special requirements. Appropriate hand hygiene was utilized, and each injection site was prepped with alcohol wipes or Chloraprep swab.     Procedure Details: From lot number [id number], expiration date 6/2023, 200 units of onabotulinumtoxinA was diluted in 4 mL 0.9% normal saline, lot # [id number]. A total of 150 units of onabotulinumtoxinA were injected using 30 gauge 0.5 in needles into the muscles listed below. 50 units of onabotulinumtoxinA were wasted.     Injection Sites: Total = 150 units onabotulinumtoxinA      and Procerus muscles - 5 units into the left and right corrugators and 5 units into the procerus (15 units total)    Frontalis muscles - 5 units into the left superior frontalis and 5 units into the right superior frontalis (2 injection sites per muscle) (10 units total)    Temporalis muscles - 12.5 units into the left temporalis muscle and 12.5 units into the right temporalis muscle (2 injection sites per muscle) (25 units total)    Occipitalis muscles - 12.5 units into the left occipitalis muscle and 12.5 units into the right occipitalis muscle (2 injection sites per muscle) (25 units total)    Splenius Capitis muscles - 12.5 units into the left splenius capitis muscle and 12.5 units into the right splenius capitis muscle (2 injection sites per muscle, divided into 2/3 anteriorly and 1/3 posteriorly) (25 units total)      Trapezius muscles - 25 units into the left trapezius muscle and 25 units into the right trapezius muscle (3 injection sites per muscle, divided 5 units, 10 units, 10 units, medial to lateral) (50 units total)    Ms. Varela tolerated the procedure well without immediate complications.  She will follow up in clinic for assessment of the effectiveness of  treatment.  She did not report any change in her pain level after the botulinumtoxinA injection procedure.    Abril Salazar MD  Pager: [phone]    Neurology Department  [facility] [address] [address]        Again, thank you for allowing me to participate in the care of your patient.      Sincerely,    Abril Salazar MD

## 2021-01-14 ENCOUNTER — HEALTH MAINTENANCE LETTER (OUTPATIENT)
Age: 26
End: 2021-01-14

## 2021-01-22 ENCOUNTER — OFFICE VISIT (OUTPATIENT)
Dept: NEUROLOGY | Facility: CLINIC | Age: 26
End: 2021-01-22
Payer: COMMERCIAL

## 2021-01-22 DIAGNOSIS — G43.709 CHRONIC MIGRAINE WITHOUT AURA WITHOUT STATUS MIGRAINOSUS, NOT INTRACTABLE: Primary | ICD-10-CM

## 2021-01-22 PROCEDURE — 64615 CHEMODENERV MUSC MIGRAINE: CPT | Performed by: PSYCHIATRY & NEUROLOGY

## 2021-01-22 RX ORDER — NARATRIPTAN 1 MG/1
1 TABLET ORAL
Qty: 18 TABLET | Refills: 11 | Status: SHIPPED | OUTPATIENT
Start: 2021-01-22

## 2021-01-22 NOTE — PROGRESS NOTES
[place], [place]  Botulinum Toxin Procedure    Abril Salazar MD  Neurology    January 22, 2021    Procedure:  OnabotulinumtoxinA injections for chronic migraine  Indication:  Chronic migraine    Ms. Varela suffers from severe intractable headaches.  She was referred by Dr. Salazar for onabotulinumtoxinA injections for headache.  Risks, benefits, and alternatives were discussed.  All questions were answered and consent given.  She decided to proceed with the injections.     Prior to initiation of botulinum toxin injections, Ms. Varela reported 15-20 headache days per month, with 15-20 severe headache days per month. Her headaches are quite disabling and often interfere with her ability to function normally.    Ms. Varela reports 7 headache days per month currently, with 3 severe headache days per month.  She has noticed a wearing off phenomenon prior to this round of botulinum toxin injections, lasting 2 weeks.    She has attempted other migraine prophylactic treatments in the past, which have included:  topiramate, venlafaxine, and gabapentin.  She is also tried nortriptyline in the past which caused side effects and was stopped.  She currently takes Spironolactone for other reasons, and this is not been helpful for headache.  She has a history of dizziness, and I did not recommend a beta-blocker out of fear of worsening this.  She also takes co-Q10 supplements, which are not clearly effective.         She currently takes CoQ10, topiramate for headache prevention.    Ms. Varela's pain was assessed prior to the procedure.  She rated her pain today as 3-4 out of 10.    Procedural Pause: Procedural pause was conducted to verify correct patient identity, procedure to be performed, correct side and site, correct patient position, and special requirements. Appropriate hand hygiene was utilized, and each injection site was prepped with alcohol wipes or Chloraprep swab.     Procedure Details: From  lot number [id number], expiration date 9/2023, 200 units of onabotulinumtoxinA was diluted in 4 mL 0.9% normal saline, lot # [id number]. A total of 150 units of onabotulinumtoxinA were injected using 30 gauge 0.5 in needles into the muscles listed below. 50 units of onabotulinumtoxinA were wasted.     Injection Sites: Total = 150 units onabotulinumtoxinA      and Procerus muscles - 5 units into the left and right corrugators and 5 units into the procerus (15 units total)    Frontalis muscles - 5 units into the left superior frontalis and 5 units into the right superior frontalis (2 injection sites per muscle) (10 units total)    Temporalis muscles - 12.5 units into the left temporalis muscle and 12.5 units into the right temporalis muscle (2 injection sites per muscle) (25 units total)    Occipitalis muscles - 12.5 units into the left occipitalis muscle and 12.5 units into the right occipitalis muscle (2 injection sites per muscle) (25 units total)    Splenius Capitis muscles - 12.5 units into the left splenius capitis muscle and 12.5 units into the right splenius capitis muscle (2 injection sites per muscle, divided into 2/3 anteriorly and 1/3 posteriorly) (25 units total)      Trapezius muscles - 25 units into the left trapezius muscle and 25 units into the right trapezius muscle (3 injection sites per muscle, divided 5 units, 10 units, 10 units, medial to lateral) (50 units total)    Ms. Varela tolerated the procedure well without immediate complications.  She will follow up in clinic for assessment of the effectiveness of treatment.  She did not report any change in her pain level after the botulinumtoxinA injection procedure.    Abril Salazar MD  Pager: [phone]    Neurology Department  [facility] [place] [id number]

## 2021-01-22 NOTE — LETTER
1/22/2021       RE: Carito Varela  [address] [facility] [address]  [place] [facility] [address]     Dear Colleague,    Thank you for referring your patient, Carito Varela, to the [facility] NEUROLOGY CLINIC [place] at [facility]. Please see a copy of my visit note below.    [place], [place]  Botulinum Toxin Procedure    Abril Salazar MD  Neurology    January 22, 2021    Procedure:  OnabotulinumtoxinA injections for chronic migraine  Indication:  Chronic migraine    Ms. Varela suffers from severe intractable headaches.  She was referred by Dr. Salazar for onabotulinumtoxinA injections for headache.  Risks, benefits, and alternatives were discussed.  All questions were answered and consent given.  She decided to proceed with the injections.     Prior to initiation of botulinum toxin injections, Ms. Varela reported 15-20 headache days per month, with 15-20 severe headache days per month. Her headaches are quite disabling and often interfere with her ability to function normally.    Ms. Varela reports 7 headache days per month currently, with 3 severe headache days per month.  She has noticed a wearing off phenomenon prior to this round of botulinum toxin injections, lasting 2 weeks.    She has attempted other migraine prophylactic treatments in the past, which have included:  topiramate, venlafaxine, and gabapentin.  She is also tried nortriptyline in the past which caused side effects and was stopped.  She currently takes Spironolactone for other reasons, and this is not been helpful for headache.  She has a history of dizziness, and I did not recommend a beta-blocker out of fear of worsening this.  She also takes co-Q10 supplements, which are not clearly effective.         She currently takes CoQ10, topiramate for headache prevention.    Ms. Varela's pain was assessed prior to the procedure.  She rated her pain today as 3-4 out of 10.    Procedural  Pause: Procedural pause was conducted to verify correct patient identity, procedure to be performed, correct side and site, correct patient position, and special requirements. Appropriate hand hygiene was utilized, and each injection site was prepped with alcohol wipes or Chloraprep swab.     Procedure Details: From lot number [id number], expiration date 9/2023, 200 units of onabotulinumtoxinA was diluted in 4 mL 0.9% normal saline, lot # [id number]. A total of 150 units of onabotulinumtoxinA were injected using 30 gauge 0.5 in needles into the muscles listed below. 50 units of onabotulinumtoxinA were wasted.     Injection Sites: Total = 150 units onabotulinumtoxinA      and Procerus muscles - 5 units into the left and right corrugators and 5 units into the procerus (15 units total)    Frontalis muscles - 5 units into the left superior frontalis and 5 units into the right superior frontalis (2 injection sites per muscle) (10 units total)    Temporalis muscles - 12.5 units into the left temporalis muscle and 12.5 units into the right temporalis muscle (2 injection sites per muscle) (25 units total)    Occipitalis muscles - 12.5 units into the left occipitalis muscle and 12.5 units into the right occipitalis muscle (2 injection sites per muscle) (25 units total)    Splenius Capitis muscles - 12.5 units into the left splenius capitis muscle and 12.5 units into the right splenius capitis muscle (2 injection sites per muscle, divided into 2/3 anteriorly and 1/3 posteriorly) (25 units total)      Trapezius muscles - 25 units into the left trapezius muscle and 25 units into the right trapezius muscle (3 injection sites per muscle, divided 5 units, 10 units, 10 units, medial to lateral) (50 units total)    Ms. Varela tolerated the procedure well without immediate complications.  She will follow up in clinic for assessment of the effectiveness of treatment.  She did not report any change in her pain level after the  botulinumtoxinA injection procedure.    Abril Salazar MD  Pager: [phone]    Neurology Department  [facility] [place]  [address] [place] [address]

## 2021-03-14 ENCOUNTER — MEDICAL CORRESPONDENCE (OUTPATIENT)
Dept: HEALTH INFORMATION MANAGEMENT | Facility: CLINIC | Age: 26
End: 2021-03-14

## 2021-03-14 ENCOUNTER — AMBULATORY - HEALTHEAST (OUTPATIENT)
Dept: SURGERY | Facility: AMBULATORY SURGERY CENTER | Age: 26
End: 2021-03-14

## 2021-03-14 DIAGNOSIS — Z11.59 ENCOUNTER FOR SCREENING FOR OTHER VIRAL DISEASES: ICD-10-CM

## 2021-04-06 ENCOUNTER — OFFICE VISIT (OUTPATIENT)
Dept: FAMILY MEDICINE | Facility: CLINIC | Age: 26
End: 2021-04-06
Payer: COMMERCIAL

## 2021-04-06 VITALS
DIASTOLIC BLOOD PRESSURE: 75 MMHG | WEIGHT: 125.5 LBS | HEART RATE: 86 BPM | RESPIRATION RATE: 16 BRPM | SYSTOLIC BLOOD PRESSURE: 113 MMHG | OXYGEN SATURATION: 100 % | TEMPERATURE: 98 F | HEIGHT: 66 IN | BODY MASS INDEX: 20.17 KG/M2

## 2021-04-06 DIAGNOSIS — N90.3 VULVAR DYSPLASIA: Primary | ICD-10-CM

## 2021-04-06 DIAGNOSIS — Z01.818 PRE-OP EXAM: ICD-10-CM

## 2021-04-06 LAB
HGB BLD-MCNC: 14.2 G/DL (ref 11.7–15.7)
POTASSIUM SERPL-SCNC: 3.8 MMOL/L (ref 3.4–5.3)

## 2021-04-06 PROCEDURE — 36415 COLL VENOUS BLD VENIPUNCTURE: CPT | Performed by: PATHOLOGY

## 2021-04-06 PROCEDURE — 84132 ASSAY OF SERUM POTASSIUM: CPT | Performed by: PATHOLOGY

## 2021-04-06 PROCEDURE — 99214 OFFICE O/P EST MOD 30 MIN: CPT | Performed by: NURSE PRACTITIONER

## 2021-04-06 PROCEDURE — 85018 HEMOGLOBIN: CPT | Performed by: PATHOLOGY

## 2021-04-06 ASSESSMENT — MIFFLIN-ST. JEOR: SCORE: 1331.01

## 2021-04-06 ASSESSMENT — PAIN SCALES - GENERAL: PAINLEVEL: NO PAIN (0)

## 2021-04-06 NOTE — LETTER
4/6/2021      RE: Carito Varela  [address]  [facility] [place] [facility] [address]       [facility] [address]  5TH FLOOR  [facility] [address]  Phone: [phone]  Fax: [phone]  Primary Provider: Sara Wallace  Pre-op Performing Provider: JATINDER ARCHER     PREOPERATIVE EVALUATION:  Today's date: 4/6/2021    Carito Varela is a 25 year old female who presents for a preoperative evaluation.    Surgical Information:  Surgery/Procedure: LOOP ELECTROSURGICAL EXCISION PROCEDURE AND REMOVAL OF VULVAR DYSPLASIA, INSERTION OF INTRAUTERINE DEVICE  Surgery Location: [facility]  Surgeon: Dr. Wallace  Surgery Date: 4/12/21  Time of Surgery: 8:30am  Where patient plans to recover: At home with family   Fax number for surgical facility: [phone]    Type of Anesthesia Anticipated: TBD    Subjective     HPI related to upcoming procedure: Abnormal pap and follow up Colposcopy 02/23/2021 showing dysplasia requiring surgical intervention.     Preop Questions 4/4/2021   1. Have you ever had a heart attack or stroke? No   2. Have you ever had surgery on your heart or blood vessels, such as a stent placement, a coronary artery bypass, or surgery on an artery in your head, neck, heart, or legs? No   3. Do you have chest pain with activity? No   4. Do you have a history of  heart failure? No   5. Do you currently have a cold, bronchitis or symptoms of other infection? No   6. Do you have a cough, shortness of breath, or wheezing? No   7. Do you or anyone in your family have previous history of blood clots? No   8. Do you or does anyone in your family have a serious bleeding problem such as prolonged bleeding following surgeries or cuts? No   9. Have you ever had problems with anemia or been told to take iron pills? No   10. Have you had any abnormal blood loss such as black, tarry or bloody stools, or abnormal vaginal bleeding? No   11. Have you  ever had a blood transfusion? No   12. Are you willing to have a blood transfusion if it is medically needed before, during, or after your surgery? Yes   13. Have you or any of your relatives ever had problems with anesthesia? YES -Father and Sister-significant nausea with general anesthesia   14. Do you have sleep apnea, excessive snoring or daytime drowsiness? No   15. Do you have any artifical heart valves or other implanted medical devices like a pacemaker, defibrillator, or continuous glucose monitor? No   16. Do you have artificial joints? No   17. Are you allergic to latex? No   18. Is there any chance that you may be pregnant? No       Health Care Directive:  Patient does not have a Health Care Directive or Living Will: Discussed advance care planning with patient; however, patient declined at this time.    Preoperative Review of :   reviewed - no record of controlled substances prescribed.    Status of Chronic Conditions:  See problem list for active medical problems.  Problems all longstanding and stable, except as noted/documented.  See ROS for pertinent symptoms related to these conditions.      Review of Systems  Constitutional, neuro, ENT, endocrine, pulmonary, cardiac, gastrointestinal, genitourinary, musculoskeletal, integument and psychiatric systems are negative, except as otherwise noted.    Patient Active Problem List    Diagnosis Date Noted     Migraine without aura and without status migrainosus, not intractable 07/27/2018     Priority: Medium      No past medical history on file.  No past surgical history on file.  Current Outpatient Medications   Medication Sig Dispense Refill     aspirin-acetaminophen-caffeine (EXCEDRIN MIGRAINE) 250-250-65 MG tablet Take 2 tablets by mouth every 6 hours as needed        COENZYME Q-10 PO Take 300 mg by mouth daily       naratriptan (AMERGE) 1 MG tablet Take 1 tablet (1 mg) by mouth at onset of headache for migraine (REPEAT DOSE IN 4 HOURS) May repeat  "in 4 hours. Max 5 tablets/24 hours. 18 tablet 11     SPIRONOLACTONE PO Take 50 mg by mouth daily        topiramate (TOPAMAX) 25 MG tablet Two at night 180 tablet 1     vitamin D3 (CHOLECALCIFEROL) 2000 units tablet Take 1 tablet by mouth daily         Allergies   Allergen Reactions     Zofran [Ondansetron] Hives     Macrobid [Nitrofurantoin] Hives and Other (See Comments)     PN: joint pain        Social History     Tobacco Use     Smoking status: Never Smoker     Smokeless tobacco: Never Used   Substance Use Topics     Alcohol use: Yes     Family History   Problem Relation Age of Onset     Hypertension Mother      Diabetes Father      History   Drug Use No         Objective     /75 (BP Location: Right arm, Patient Position: Sitting, Cuff Size: Adult Regular)   Pulse 86   Temp 98  F (36.7  C)   Resp 16   Ht 1.676 m (5' 6\")   Wt 58.5 kg (129 lb)   SpO2 100%   BMI 20.82 kg/m      Physical Exam     GENERAL APPEARANCE: healthy, alert and no distress     EYES: EOMI, PERRL     HENT: ear canals and TM's normal and nose and mouth without ulcers or lesions     NECK: no adenopathy, no asymmetry, masses, or scars and thyroid normal to palpation     RESP: lungs clear to auscultation - no rales, rhonchi or wheezes     CV: regular rates and rhythm, normal S1 S2, no S3 or S4 and no murmur, click or rub     ABDOMEN:  soft, nontender, no HSM or masses and bowel sounds normal     MS: extremities normal- no gross deformities noted, no evidence of inflammation in joints, FROM in all extremities.     SKIN: no suspicious lesions or rashes     NEURO: Normal strength and tone, sensory exam grossly normal, mentation intact and speech normal     PSYCH: mentation appears normal. and affect normal/bright     LYMPHATICS: No cervical adenopathy    Recent Labs   Lab Test 06/28/19  1005      POTASSIUM 4.0   CR 0.91        Diagnostics:  Labs pending at this time.  Results will be reviewed when available.   No EKG required, no " history of coronary heart disease, significant arrhythmia, peripheral arterial disease or other structural heart disease.    Revised Cardiac Risk Index (RCRI):  The patient has the following serious cardiovascular risks for perioperative complications:   - No serious cardiac risks = 0 points     RCRI Interpretation: 0 points: Class I (very low risk - 0.4% complication rate)      Assessment & Plan     The proposed surgical procedure is considered LOW risk.    Vulvar dysplasia      Pre-op exam    Risks and Recommendations:  The patient has the following additional risks and recommendations for perioperative complications:   - No identified additional risk factors other than previously addressed    Medication Instructions:  Patient is ok to take Spironolactone the morning of her procedure.     RECOMMENDATION:  APPROVAL GIVEN to proceed with proposed procedure, without further diagnostic evaluation.    Signed Electronically by: ALFONZO Neal CNP    Copy of this evaluation report is provided to requesting physician.            Sally Fairbanks NP

## 2021-04-06 NOTE — PROGRESS NOTES
[facility] [address] [address]  Phone: [phone]  Fax: [phone]  Primary Provider: Sara Wallace  Pre-op Performing Provider: JATINDER ARCHER     PREOPERATIVE EVALUATION:  Today's date: 4/6/2021    Carito Varela is a 25 year old female who presents for a preoperative evaluation.    Surgical Information:  Surgery/Procedure: LOOP ELECTROSURGICAL EXCISION PROCEDURE AND REMOVAL OF VULVAR DYSPLASIA, INSERTION OF INTRAUTERINE DEVICE  Surgery Location: [facility]  Surgeon: Dr. Wallace  Surgery Date: 4/12/21  Time of Surgery: 8:30am  Where patient plans to recover: At home with family   Fax number for surgical facility: [phone]    Type of Anesthesia Anticipated: TBD    Subjective     HPI related to upcoming procedure: Abnormal pap and follow up Colposcopy 02/23/2021 showing dysplasia requiring surgical intervention.     Preop Questions 4/4/2021   1. Have you ever had a heart attack or stroke? No   2. Have you ever had surgery on your heart or blood vessels, such as a stent placement, a coronary artery bypass, or surgery on an artery in your head, neck, heart, or legs? No   3. Do you have chest pain with activity? No   4. Do you have a history of  heart failure? No   5. Do you currently have a cold, bronchitis or symptoms of other infection? No   6. Do you have a cough, shortness of breath, or wheezing? No   7. Do you or anyone in your family have previous history of blood clots? No   8. Do you or does anyone in your family have a serious bleeding problem such as prolonged bleeding following surgeries or cuts? No   9. Have you ever had problems with anemia or been told to take iron pills? No   10. Have you had any abnormal blood loss such as black, tarry or bloody stools, or abnormal vaginal bleeding? No   11. Have you ever had a blood transfusion? No   12. Are you willing to have a blood transfusion if it is  medically needed before, during, or after your surgery? Yes   13. Have you or any of your relatives ever had problems with anesthesia? YES -Father and Sister-significant nausea with general anesthesia   14. Do you have sleep apnea, excessive snoring or daytime drowsiness? No   15. Do you have any artifical heart valves or other implanted medical devices like a pacemaker, defibrillator, or continuous glucose monitor? No   16. Do you have artificial joints? No   17. Are you allergic to latex? No   18. Is there any chance that you may be pregnant? No       Health Care Directive:  Patient does not have a Health Care Directive or Living Will: Discussed advance care planning with patient; however, patient declined at this time.    Preoperative Review of :   reviewed - no record of controlled substances prescribed.    Status of Chronic Conditions:  See problem list for active medical problems.  Problems all longstanding and stable, except as noted/documented.  See ROS for pertinent symptoms related to these conditions.      Review of Systems  Constitutional, neuro, ENT, endocrine, pulmonary, cardiac, gastrointestinal, genitourinary, musculoskeletal, integument and psychiatric systems are negative, except as otherwise noted.    Patient Active Problem List    Diagnosis Date Noted     Migraine without aura and without status migrainosus, not intractable 07/27/2018     Priority: Medium      No past medical history on file.  No past surgical history on file.  Current Outpatient Medications   Medication Sig Dispense Refill     aspirin-acetaminophen-caffeine (EXCEDRIN MIGRAINE) 250-250-65 MG tablet Take 2 tablets by mouth every 6 hours as needed        COENZYME Q-10 PO Take 300 mg by mouth daily       naratriptan (AMERGE) 1 MG tablet Take 1 tablet (1 mg) by mouth at onset of headache for migraine (REPEAT DOSE IN 4 HOURS) May repeat in 4 hours. Max 5 tablets/24 hours. 18 tablet 11     SPIRONOLACTONE PO Take 50 mg by mouth  "daily        topiramate (TOPAMAX) 25 MG tablet Two at night 180 tablet 1     vitamin D3 (CHOLECALCIFEROL) 2000 units tablet Take 1 tablet by mouth daily         Allergies   Allergen Reactions     Zofran [Ondansetron] Hives     Macrobid [Nitrofurantoin] Hives and Other (See Comments)     PN: joint pain        Social History     Tobacco Use     Smoking status: Never Smoker     Smokeless tobacco: Never Used   Substance Use Topics     Alcohol use: Yes     Family History   Problem Relation Age of Onset     Hypertension Mother      Diabetes Father      History   Drug Use No         Objective     /75 (BP Location: Right arm, Patient Position: Sitting, Cuff Size: Adult Regular)   Pulse 86   Temp 98  F (36.7  C)   Resp 16   Ht 1.676 m (5' 6\")   Wt 58.5 kg (129 lb)   SpO2 100%   BMI 20.82 kg/m      Physical Exam     GENERAL APPEARANCE: healthy, alert and no distress     EYES: EOMI, PERRL     HENT: ear canals and TM's normal and nose and mouth without ulcers or lesions     NECK: no adenopathy, no asymmetry, masses, or scars and thyroid normal to palpation     RESP: lungs clear to auscultation - no rales, rhonchi or wheezes     CV: regular rates and rhythm, normal S1 S2, no S3 or S4 and no murmur, click or rub     ABDOMEN:  soft, nontender, no HSM or masses and bowel sounds normal     MS: extremities normal- no gross deformities noted, no evidence of inflammation in joints, FROM in all extremities.     SKIN: no suspicious lesions or rashes     NEURO: Normal strength and tone, sensory exam grossly normal, mentation intact and speech normal     PSYCH: mentation appears normal. and affect normal/bright     LYMPHATICS: No cervical adenopathy    Recent Labs   Lab Test 06/28/19  1005      POTASSIUM 4.0   CR 0.91        Diagnostics:  Labs pending at this time.  Results will be reviewed when available.   No EKG required, no history of coronary heart disease, significant arrhythmia, peripheral arterial disease or " other structural heart disease.    Revised Cardiac Risk Index (RCRI):  The patient has the following serious cardiovascular risks for perioperative complications:   - No serious cardiac risks = 0 points     RCRI Interpretation: 0 points: Class I (very low risk - 0.4% complication rate)      Assessment & Plan     The proposed surgical procedure is considered LOW risk.    Vulvar dysplasia      Pre-op exam    Risks and Recommendations:  The patient has the following additional risks and recommendations for perioperative complications:   - No identified additional risk factors other than previously addressed    Medication Instructions:  Patient is ok to take Spironolactone the morning of her procedure.     RECOMMENDATION:  APPROVAL GIVEN to proceed with proposed procedure, without further diagnostic evaluation.    Signed Electronically by: ALFONZO Neal CNP    Copy of this evaluation report is provided to requesting physician.

## 2021-04-06 NOTE — PATIENT INSTRUCTIONS

## 2021-04-06 NOTE — NURSING NOTE
Chief Complaint   Patient presents with     Pre-Op Exam     Patient comes in for a pre op exam.          Jin Cannon MA on 4/6/2021 at 6:55 AM

## 2021-04-08 ASSESSMENT — MIFFLIN-ST. JEOR
SCORE: 1323.75
SCORE: 1323.75

## 2021-04-09 ENCOUNTER — ANESTHESIA - HEALTHEAST (OUTPATIENT)
Dept: SURGERY | Facility: AMBULATORY SURGERY CENTER | Age: 26
End: 2021-04-09

## 2021-04-09 DIAGNOSIS — Z11.59 ENCOUNTER FOR SCREENING FOR OTHER VIRAL DISEASES: Primary | ICD-10-CM

## 2021-04-09 LAB
SARS-COV-2 RNA RESP QL NAA+PROBE: NORMAL
SPECIMEN SOURCE: NORMAL

## 2021-04-09 PROCEDURE — U0003 INFECTIOUS AGENT DETECTION BY NUCLEIC ACID (DNA OR RNA); SEVERE ACUTE RESPIRATORY SYNDROME CORONAVIRUS 2 (SARS-COV-2) (CORONAVIRUS DISEASE [COVID-19]), AMPLIFIED PROBE TECHNIQUE, MAKING USE OF HIGH THROUGHPUT TECHNOLOGIES AS DESCRIBED BY CMS-2020-01-R: HCPCS | Mod: 90 | Performed by: PATHOLOGY

## 2021-04-09 PROCEDURE — U0005 INFEC AGEN DETEC AMPLI PROBE: HCPCS | Mod: 90 | Performed by: PATHOLOGY

## 2021-04-09 PROCEDURE — 99000 SPECIMEN HANDLING OFFICE-LAB: CPT | Performed by: PATHOLOGY

## 2021-04-10 LAB
LABORATORY COMMENT REPORT: NORMAL
SARS-COV-2 RNA RESP QL NAA+PROBE: NEGATIVE
SPECIMEN SOURCE: NORMAL

## 2021-04-12 ENCOUNTER — HOSPITAL ENCOUNTER (OUTPATIENT)
Dept: SURGERY | Facility: AMBULATORY SURGERY CENTER | Age: 26
Discharge: HOME | End: 2021-04-12
Attending: OBSTETRICS & GYNECOLOGY | Admitting: OBSTETRICS & GYNECOLOGY
Payer: COMMERCIAL

## 2021-04-12 ENCOUNTER — COMMUNICATION - HEALTHEAST (OUTPATIENT)
Dept: SCHEDULING | Facility: CLINIC | Age: 26
End: 2021-04-12

## 2021-04-12 ENCOUNTER — SURGERY - HEALTHEAST (OUTPATIENT)
Dept: SURGERY | Facility: AMBULATORY SURGERY CENTER | Age: 26
End: 2021-04-12

## 2021-04-12 DIAGNOSIS — R87.613 PAP SMEAR ABNORMALITY OF CERVIX WITH HGSIL: ICD-10-CM

## 2021-04-12 DIAGNOSIS — G43.009 MIGRAINE WITHOUT AURA AND WITHOUT STATUS MIGRAINOSUS, NOT INTRACTABLE: ICD-10-CM

## 2021-04-12 DIAGNOSIS — N90.3 VULVAR DYSPLASIA: ICD-10-CM

## 2021-04-12 LAB
DIPSTICK EXPIRATION DATE - HISTORICAL: NORMAL
DIPSTICK LOT NUMBER - HISTORICAL: NORMAL
POC PREG URINE (HCG) HE - HISTORICAL: NEGATIVE
POC SPECIFIC GRAVITY, URINE - HISTORICAL: NORMAL
POCT KIT EXPIRATION DATE - HISTORICAL: NORMAL
POCT KIT LOT NUMBER HE - HISTORICAL: NORMAL
POCT NEGATIVE CONTROL HE - HISTORICAL: NORMAL
POCT POSITIVE CONTROL HE - HISTORICAL: NORMAL

## 2021-04-12 RX ORDER — TOPIRAMATE 25 MG/1
TABLET, FILM COATED ORAL
Qty: 180 TABLET | Refills: 3 | Status: SHIPPED | OUTPATIENT
Start: 2021-04-12 | End: 2022-04-06

## 2021-04-12 ASSESSMENT — MIFFLIN-ST. JEOR
SCORE: 1323.75
SCORE: 1323.75

## 2021-04-16 ENCOUNTER — OFFICE VISIT (OUTPATIENT)
Dept: NEUROLOGY | Facility: CLINIC | Age: 26
End: 2021-04-16
Payer: COMMERCIAL

## 2021-04-16 DIAGNOSIS — G43.709 CHRONIC MIGRAINE WITHOUT AURA WITHOUT STATUS MIGRAINOSUS, NOT INTRACTABLE: Primary | ICD-10-CM

## 2021-04-16 PROCEDURE — 64615 CHEMODENERV MUSC MIGRAINE: CPT | Performed by: PSYCHIATRY & NEUROLOGY

## 2021-04-16 NOTE — LETTER
4/16/2021      RE: Carito Varela  [address]  [place] [address]       [place], [place]  Botulinum Toxin Procedure    Abril Salazar MD  Neurology    April 16, 2021    Procedure:  OnabotulinumtoxinA injections for chronic migraine  Indication:  Chronic migraine  Met  Ms. Varela suffers from severe intractable headaches.  She was referred by Dr. Salazar for onabotulinumtoxinA injections for headache.  Risks, benefits, and alternatives were discussed.  All questions were answered and consent given.  She decided to proceed with the injections.     Prior to initiation of botulinum toxin injections, Ms. Varela reported 15-20 headache days per month, with 15-20 severe headache days per month. Her headaches are quite disabling and often interfere with her ability to function normally.    Ms. Varela reports 2 headache days per month currently, with 2 severe headache days per month.  She has not noticed a wearing off phenomenon prior to this round of botulinum toxin injections.    She has attempted other migraine prophylactic treatments in the past, which have included:  topiramate, venlafaxine, and gabapentin.  She is also tried nortriptyline in the past which caused side effects and was stopped.  She currently takes Spironolactone for other reasons, and this is not been helpful for headache.  She has a history of dizziness, and I did not recommend a beta-blocker out of fear of worsening this.  She also takes co-Q10 supplements, which are not clearly effective.         She currently takes CoQ10, topiramate for headache prevention.    Ms. Varela's pain was assessed prior to the procedure.  She rated her pain today as 0 out of 10.    Procedural Pause: Procedural pause was conducted to verify correct patient identity, procedure to be performed, correct side and site, correct patient position, and special requirements. Appropriate hand hygiene was utilized, and each injection site  was prepped with alcohol wipes or Chloraprep swab.     Procedure Details: From lot number [id number], expiration date 12/2023, 200 units of onabotulinumtoxinA was diluted in 4 mL 0.9% normal saline, lot # -[id number]. A total of 150 units of onabotulinumtoxinA were injected using 30 gauge 0.5 in needles into the muscles listed below. 50 units of onabotulinumtoxinA were wasted.     Injection Sites: Total = 150 units onabotulinumtoxinA      and Procerus muscles - 5 units into the left and right corrugators and 5 units into the procerus (15 units total)    Frontalis muscles - 5 units into the left superior frontalis and 5 units into the right superior frontalis (2 injection sites per muscle) (10 units total)    Temporalis muscles - 12.5 units into the left temporalis muscle and 12.5 units into the right temporalis muscle (2 injection sites per muscle) (25 units total)    Occipitalis muscles - 12.5 units into the left occipitalis muscle and 12.5 units into the right occipitalis muscle (2 injection sites per muscle) (25 units total)    Splenius Capitis muscles - 12.5 units into the left splenius capitis muscle and 12.5 units into the right splenius capitis muscle (2 injection sites per muscle, divided into 2/3 anteriorly and 1/3 posteriorly) (25 units total)      Trapezius muscles - 25 units into the left trapezius muscle and 25 units into the right trapezius muscle (3 injection sites per muscle, divided 5 units, 10 units, 10 units, medial to lateral) (50 units total)    Ms. Varela tolerated the procedure well without immediate complications.  She will follow up in clinic for assessment of the effectiveness of treatment.  She did not report any change in her pain level after the botulinumtoxinA injection procedure.    Abril Salazar MD  Pager: [phone]    Neurology Department  [facility] [address] [address]        Abril Salazar MD

## 2021-04-16 NOTE — PROGRESS NOTES
[place], [place]  Botulinum Toxin Procedure    Abril Salazar MD  Neurology    April 16, 2021    Procedure:  OnabotulinumtoxinA injections for chronic migraine  Indication:  Chronic migraine  Met  Ms. Varela suffers from severe intractable headaches.  She was referred by Dr. Salazar for onabotulinumtoxinA injections for headache.  Risks, benefits, and alternatives were discussed.  All questions were answered and consent given.  She decided to proceed with the injections.     Prior to initiation of botulinum toxin injections, Ms. Varela reported 15-20 headache days per month, with 15-20 severe headache days per month. Her headaches are quite disabling and often interfere with her ability to function normally.    Ms. Varela reports 2 headache days per month currently, with 2 severe headache days per month.  She has not noticed a wearing off phenomenon prior to this round of botulinum toxin injections.    She has attempted other migraine prophylactic treatments in the past, which have included:  topiramate, venlafaxine, and gabapentin.  She is also tried nortriptyline in the past which caused side effects and was stopped.  She currently takes Spironolactone for other reasons, and this is not been helpful for headache.  She has a history of dizziness, and I did not recommend a beta-blocker out of fear of worsening this.  She also takes co-Q10 supplements, which are not clearly effective.         She currently takes CoQ10, topiramate for headache prevention.    Ms. Varela's pain was assessed prior to the procedure.  She rated her pain today as 0 out of 10.    Procedural Pause: Procedural pause was conducted to verify correct patient identity, procedure to be performed, correct side and site, correct patient position, and special requirements. Appropriate hand hygiene was utilized, and each injection site was prepped with alcohol wipes or Chloraprep swab.     Procedure Details: From lot number [id number], expiration date 12/2023, 200 units of onabotulinumtoxinA was diluted in 4 mL 0.9% normal saline, lot # -[id number]. A total of 150 units of onabotulinumtoxinA were injected using 30 gauge 0.5 in needles into the muscles listed below. 50 units of onabotulinumtoxinA were wasted.     Injection Sites: Total = 150 units onabotulinumtoxinA      and Procerus muscles - 5 units into the left and right corrugators and 5 units into the procerus (15 units total)    Frontalis muscles - 5 units into the left superior frontalis and 5 units into the right superior frontalis (2 injection sites per muscle) (10 units total)    Temporalis muscles - 12.5 units into the left temporalis muscle and 12.5 units into the right temporalis muscle (2 injection sites per muscle) (25 units total)    Occipitalis muscles - 12.5 units into the left occipitalis muscle and 12.5 units into the right occipitalis muscle (2 injection sites per muscle) (25 units total)    Splenius Capitis muscles - 12.5 units into the left splenius capitis muscle and 12.5 units into the right splenius capitis muscle (2 injection sites per muscle, divided into 2/3 anteriorly and 1/3 posteriorly) (25 units total)      Trapezius muscles - 25 units into the left trapezius muscle and 25 units into the right trapezius muscle (3 injection sites per muscle, divided 5 units, 10 units, 10 units, medial to lateral) (50 units total)    Ms. Varela tolerated the procedure well without immediate complications.  She will follow up in clinic for assessment of the effectiveness of treatment.  She did not report any change in her pain level after the botulinumtoxinA injection procedure.    Abril Salazar MD  Pager: [phone]    Neurology Department  [facility] [place] [id number]

## 2021-04-16 NOTE — LETTER
4/16/2021       RE: Carito Varela  [address]  [place] [address]     Dear Colleague,    Thank you for referring your patient, Carito Varela, to the [facility] NEUROLOG[facility] [place] [facility]. Please see a copy of my visit note below.    [place], [place]  Botulinum Toxin Procedure    Abril Salazar MD  Neurology    April 16, 2021    Procedure:  OnabotulinumtoxinA injections for chronic migraine  Indication:  Chronic migraine  Met  Ms. Varela suffers from severe intractable headaches.  She was referred by Dr. Salazar for onabotulinumtoxinA injections for headache.  Risks, benefits, and alternatives were discussed.  All questions were answered and consent given.  She decided to proceed with the injections.     Prior to initiation of botulinum toxin injections, Ms. Varela reported 15-20 headache days per month, with 15-20 severe headache days per month. Her headaches are quite disabling and often interfere with her ability to function normally.    Ms. Varela reports 2 headache days per month currently, with 2 severe headache days per month.  She has not noticed a wearing off phenomenon prior to this round of botulinum toxin injections.    She has attempted other migraine prophylactic treatments in the past, which have included:  topiramate, venlafaxine, and gabapentin.  She is also tried nortriptyline in the past which caused side effects and was stopped.  She currently takes Spironolactone for other reasons, and this is not been helpful for headache.  She has a history of dizziness, and I did not recommend a beta-blocker out of fear of worsening this.  She also takes co-Q10 supplements, which are not clearly effective.         She currently takes CoQ10, topiramate for headache prevention.    Ms. Varela's pain was assessed prior to the procedure.  She rated her pain today as 0 out of 10.    Procedural Pause:  Procedural pause was conducted to verify correct patient identity, procedure to be performed, correct side and site, correct patient position, and special requirements. Appropriate hand hygiene was utilized, and each injection site was prepped with alcohol wipes or Chloraprep swab.     Procedure Details: From lot number [id number], expiration date 12/2023, 200 units of onabotulinumtoxinA was diluted in 4 mL 0.9% normal saline, lot # -[id number]. A total of 150 units of onabotulinumtoxinA were injected using 30 gauge 0.5 in needles into the muscles listed below. 50 units of onabotulinumtoxinA were wasted.     Injection Sites: Total = 150 units onabotulinumtoxinA      and Procerus muscles - 5 units into the left and right corrugators and 5 units into the procerus (15 units total)    Frontalis muscles - 5 units into the left superior frontalis and 5 units into the right superior frontalis (2 injection sites per muscle) (10 units total)    Temporalis muscles - 12.5 units into the left temporalis muscle and 12.5 units into the right temporalis muscle (2 injection sites per muscle) (25 units total)    Occipitalis muscles - 12.5 units into the left occipitalis muscle and 12.5 units into the right occipitalis muscle (2 injection sites per muscle) (25 units total)    Splenius Capitis muscles - 12.5 units into the left splenius capitis muscle and 12.5 units into the right splenius capitis muscle (2 injection sites per muscle, divided into 2/3 anteriorly and 1/3 posteriorly) (25 units total)      Trapezius muscles - 25 units into the left trapezius muscle and 25 units into the right trapezius muscle (3 injection sites per muscle, divided 5 units, 10 units, 10 units, medial to lateral) (50 units total)    Ms. Varela tolerated the procedure well without immediate complications.  She will follow up in clinic for assessment of the effectiveness of treatment.  She did not report any change in her pain level after the  botulinumtoxinA injection procedure.    Abril Salazar MD  Pager: [phone]    Neurology Department  [facility] [place] [address]        Again, thank you for allowing me to participate in the care of your patient.      Sincerely,    Abril Salazar MD

## 2021-06-05 VITALS
HEIGHT: 66 IN | HEIGHT: 66 IN | WEIGHT: 125 LBS | BODY MASS INDEX: 20.09 KG/M2 | BODY MASS INDEX: 20.09 KG/M2 | WEIGHT: 125 LBS

## 2021-06-16 NOTE — OP NOTE
DATE OF SERVICE: 04/12/2021    PREOPERATIVE DIAGNOSIS:  Cervical intraepithelial neoplasia 2, requests Mirena IUD,  VIN1.     POSTOPERATIVE DIAGNOSIS:  Cervical intraepithelial neoplasia 2, requests Mirena IUD,  VIN1.    HISTORY:  Patient is a 25-year-old para 0 who had an abnormal Pap smear negative  1618, but suggestion of high-grade.  Colposcopy was performed which showed JUNE 2.   She also had a biopsy of the vulva, which showed VIN1, although suspicion more for  lichen sclerosis.  The patient had requested an IUD, but because of abnormal Paps we  waited until treatment to insert an IUD.  Risks, benefits, alternatives reviewed,  questions answered, permit signed.    DESCRIPTION OF PROCEDURE:  Patient was taken to the operating room under MAC, placed  in the dorsal lithotomy position, prepped and draped in the usual manner.  Pause for  Cause was undertaken.  Coverage speculum was placed and 4% acetic acid was applied  to the cervix.  Colposcopy was performed.  She had large transformation zone.  Her   cervix was injected with 1% lidocaine with epinephrine, totalling approximately 7  mL.  A medium LEEP was used, taking the bed down to approximately 7 mm, and this was  opened at 9 o'clock.  Shavings were done from the external os.  Endocervical  curettings were then obtained.  The cervix was then wiped with Betadine and  tenaculum placed and then endometrial biopsy was performed because the patient was  going to have her period within the next couple days.  The uterus measured to 6 and  a Mirena IUD was easily inserted and the string was cut appropriately.  Monsel's was  then placed on the cervix with excellent hemostasis.  Vagina was normal.  The vulva  was then wiped with 4% acetic acid. From the speculum, there was  already a small  irritation on the posterior fourchette.  This was wiped with Betadine and  infiltrated with 1% Carbocaine, very superficial biopsy was done and handed off for  path.  The vulva  looked more like Lichen sclerosis with some confusion on the right  labia minora and even with high magnification did not look like dysplasia.  Biopsy  was sent.  Area treated with silver nitrate and wiped with Betadine, and then  patient transferred to [facility] in stable condition.    SURGEON:  Joseph Keyes MD    COMPLICATIONS:  None.    ANESTHESIA:  MAC and local.    PROCEDURE:  LEEP cone biopsy, endocervical endometrial biopsy, placement of Mirena  IUD, and vulvar biopsy.      JOSEPH KEYES MD  sn  D 04/12/2021 09:53:11  T 04/12/2021 10:38:32  R 04/12/2021 10:38:32  [id number]        cc:JOSEPH KEYES MD  [facility]

## 2021-06-16 NOTE — ANESTHESIA CARE TRANSFER NOTE
Last vitals:   Vitals:    04/12/21 0935   BP: 96/53   Pulse: 75   Resp: 16   Temp: (P) 36.3  C (97.4  F)   SpO2: 100%     Pt brought to phase 2 on 6L O2. Monitors applied. VSS.    Patient's level of consciousness is drowsy  Spontaneous respirations: yes  Maintains airway independently: yes  Dentition unchanged: yes  Oropharynx: oropharynx clear of all foreign objects    QCDR Measures:  ASA# 20 - Surgical Safety Checklist: WHO surgical safety checklist completed prior to induction    PQRS# 430 - Adult PONV Prevention: 4558F - Pt received => 2 anti-emetic agents (different classes) preop & intraop  ASA# 8 - Peds PONV Prevention: NA - Not pediatric patient, not GA or 2 or more risk factors NOT present  PQRS# 424 - Holley-op Temp Management: 4559F - At least one body temp DOCUMENTED => 35.5C or 95.9F within required timeframe  PQRS# 426 - PACU Transfer Protocol: - Transfer of care checklist used  ASA# 14 - Acute Post-op Pain: ASA14B - Patient did NOT experience pain >= 7 out of 10

## 2021-06-16 NOTE — ANESTHESIA POSTPROCEDURE EVALUATION
Patient: Carito Varela  Procedure(s):  LOOP ELECTROSURGICAL EXCISION PROCEDURE AND REMOVAL OF VULVAR DYSPLASIA, INSERTION OF INTRAUTERINE DEVICE, ENDOMETRIAL BIOPSIES  INSERTION OF INTRAUTERINE DEVICE  Anesthesia type: MAC    Patient location: Phase II Recovery  Last vitals:   Vitals Value Taken Time   /68 04/12/21 1000   Temp 36.3  C (97.4  F) 04/12/21 0935   Pulse 81 04/12/21 1008   Resp 16 04/12/21 1000   SpO2 99 % 04/12/21 1008   Vitals shown include unvalidated device data.  Post vital signs: stable  Level of consciousness: awake and responds to simple questions  Post-anesthesia pain: pain controlled  Post-anesthesia nausea and vomiting: no  Pulmonary: unassisted, return to baseline  Cardiovascular: stable and blood pressure at baseline  Hydration: adequate  Anesthetic events: no    QCDR Measures:  ASA# 11 - Holley-op Cardiac Arrest: ASA11B - Patient did NOT experience unanticipated cardiac arrest  ASA# 12 - Holley-op Mortality Rate: ASA12B - Patient did NOT die  ASA# 13 - PACU Re-Intubation Rate: NA - No ETT / LMA used for case  ASA# 10 - Composite Anes Safety: ASA10A - No serious adverse event    Additional Notes:

## 2021-06-16 NOTE — ANESTHESIA PREPROCEDURE EVALUATION
Anesthesia Evaluation      Patient summary reviewed   No history of anesthetic complications     Airway   Mallampati: II  Neck ROM: full   Pulmonary - normal exam    breath sounds clear to auscultation  (-) sleep apnea, not a smoker                         Cardiovascular - negative ROS and normal exam  Exercise tolerance: > or = 4 METS  Rhythm: regular  Rate: normal,         Neuro/Psych      Comments: migraines    Endo/Other    (-) no obesity, not pregnant     Comments: Vulvar dysplasis    GI/Hepatic/Renal - negative ROS           Dental - normal exam                        Anesthesia Plan  Planned anesthetic: MAC    ASA 2   Induction: intravenous   Anesthetic plan and risks discussed with: patient  Anesthesia plan special considerations: antiemetics,   Post-op plan: routine recovery

## 2021-07-16 ENCOUNTER — OFFICE VISIT (OUTPATIENT)
Dept: NEUROLOGY | Facility: CLINIC | Age: 26
End: 2021-07-16
Payer: COMMERCIAL

## 2021-07-16 DIAGNOSIS — G43.709 CHRONIC MIGRAINE WITHOUT AURA WITHOUT STATUS MIGRAINOSUS, NOT INTRACTABLE: Primary | ICD-10-CM

## 2021-07-16 PROCEDURE — 64615 CHEMODENERV MUSC MIGRAINE: CPT | Performed by: PSYCHIATRY & NEUROLOGY

## 2021-07-16 NOTE — LETTER
7/16/2021       RE: Carito Varela  [address]  [facility] [address]     Dear Colleague,    Thank you for referring your patient, Carito Varela, to the [facility] [place] at [facility]. Please see a copy of my visit note below.    [place], [place]  Botulinum Toxin Procedure    Abril Salazar MD  Neurology    July 16, 2021    Procedure:  OnabotulinumtoxinA injections for chronic migraine  Indication:  Chronic migraine    Ms. Varela suffers from severe intractable headaches.  She was referred by Dr. Salazar for onabotulinumtoxinA injections for headache.  Risks, benefits, and alternatives were discussed.  All questions were answered and consent given.  She decided to proceed with the injections.     Prior to initiation of botulinum toxin injections, Ms. Varela reported 15-20 headache days per month, with 15-20 severe headache days per month. Her headaches are quite disabling and often interfere with her ability to function normally.    Ms. Varela reports 4 headache days per month currently, with 1-2 severe headache days per month.  She has noticed a wearing off phenomenon prior to this round of botulinum toxin injections, about 2 weeks.    She has attempted other migraine prophylactic treatments in the past, which have included:  topiramate, venlafaxine, and gabapentin.  She is also tried nortriptyline in the past which caused side effects and was stopped.  She currently takes Spironolactone for other reasons, and this is not been helpful for headache.  She has a history of dizziness, and I did not recommend a beta-blocker out of fear of worsening this.  She also takes co-Q10 supplements, which are not clearly effective.         She currently takes CoQ10, topiramate for headache prevention. She is going to trial tapering topiramate.     Ms. Varela's pain was assessed prior to the procedure.  She rated  her pain today as 0 out of 10.    Procedural Pause: Procedural pause was conducted to verify correct patient identity, procedure to be performed, correct side and site, correct patient position, and special requirements. Appropriate hand hygiene was utilized, and each injection site was prepped with alcohol wipes or Chloraprep swab.     Procedure Details: 200 units of onabotulinumtoxinA was diluted in 4 mL 0.9% normal saline. A total of 150 units of onabotulinumtoxinA were injected using 30 gauge 0.5 in needles into the muscles listed below. 50 units of onabotulinumtoxinA were wasted.     Injection Sites: Total = 150 units onabotulinumtoxinA      and Procerus muscles - 5 units into the left and right corrugators and 5 units into the procerus (15 units total)    Frontalis muscles - 5 units into the left superior frontalis and 5 units into the right superior frontalis (2 injection sites per muscle) (10 units total)    Temporalis muscles - 12.5 units into the left temporalis muscle and 12.5 units into the right temporalis muscle (2 injection sites per muscle) (25 units total)    Occipitalis muscles - 12.5 units into the left occipitalis muscle and 12.5 units into the right occipitalis muscle (2 injection sites per muscle) (25 units total)    Splenius Capitis muscles - 12.5 units into the left splenius capitis muscle and 12.5 units into the right splenius capitis muscle (2 injection sites per muscle, divided into 2/3 anteriorly and 1/3 posteriorly) (25 units total)      Trapezius muscles - 25 units into the left trapezius muscle and 25 units into the right trapezius muscle (3 injection sites per muscle, divided 5 units, 10 units, 10 units, medial to lateral) (50 units total)    Ms. Varela tolerated the procedure well without immediate complications.  She will follow up in clinic for assessment of the effectiveness of treatment.  She did not report any change in her pain level after the botulinumtoxinA injection  procedure.    Abril Salazar MD  Pager: [phone]    Neurology Department  [facility] [address] [address]    Again, thank you for allowing me to participate in the care of your patient.      Sincerely,  Abril Salazar MD

## 2021-07-16 NOTE — PROGRESS NOTES
[place], [place]  Botulinum Toxin Procedure    Abril Salazar MD  Neurology    July 16, 2021    Procedure:  OnabotulinumtoxinA injections for chronic migraine  Indication:  Chronic migraine    Ms. Varela suffers from severe intractable headaches.  She was referred by Dr. Salazar for onabotulinumtoxinA injections for headache.  Risks, benefits, and alternatives were discussed.  All questions were answered and consent given.  She decided to proceed with the injections.     Prior to initiation of botulinum toxin injections, Ms. Varela reported 15-20 headache days per month, with 15-20 severe headache days per month. Her headaches are quite disabling and often interfere with her ability to function normally.    Ms. Varela reports 4 headache days per month currently, with 1-2 severe headache days per month.  She has noticed a wearing off phenomenon prior to this round of botulinum toxin injections, about 2 weeks.    She has attempted other migraine prophylactic treatments in the past, which have included:  topiramate, venlafaxine, and gabapentin.  She is also tried nortriptyline in the past which caused side effects and was stopped.  She currently takes Spironolactone for other reasons, and this is not been helpful for headache.  She has a history of dizziness, and I did not recommend a beta-blocker out of fear of worsening this.  She also takes co-Q10 supplements, which are not clearly effective.         She currently takes CoQ10, topiramate for headache prevention. She is going to trial tapering topiramate.     Ms. Varela's pain was assessed prior to the procedure.  She rated her pain today as 0 out of 10.    Procedural Pause: Procedural pause was conducted to verify correct patient identity, procedure to be performed, correct side and site, correct patient position, and special requirements. Appropriate hand hygiene was utilized, and each injection site was prepped with alcohol wipes or  Chloraprep swab.     Procedure Details: 200 units of onabotulinumtoxinA was diluted in 4 mL 0.9% normal saline. A total of 150 units of onabotulinumtoxinA were injected using 30 gauge 0.5 in needles into the muscles listed below. 50 units of onabotulinumtoxinA were wasted.     Injection Sites: Total = 150 units onabotulinumtoxinA      and Procerus muscles - 5 units into the left and right corrugators and 5 units into the procerus (15 units total)    Frontalis muscles - 5 units into the left superior frontalis and 5 units into the right superior frontalis (2 injection sites per muscle) (10 units total)    Temporalis muscles - 12.5 units into the left temporalis muscle and 12.5 units into the right temporalis muscle (2 injection sites per muscle) (25 units total)    Occipitalis muscles - 12.5 units into the left occipitalis muscle and 12.5 units into the right occipitalis muscle (2 injection sites per muscle) (25 units total)    Splenius Capitis muscles - 12.5 units into the left splenius capitis muscle and 12.5 units into the right splenius capitis muscle (2 injection sites per muscle, divided into 2/3 anteriorly and 1/3 posteriorly) (25 units total)      Trapezius muscles - 25 units into the left trapezius muscle and 25 units into the right trapezius muscle (3 injection sites per muscle, divided 5 units, 10 units, 10 units, medial to lateral) (50 units total)    Ms. Varela tolerated the procedure well without immediate complications.  She will follow up in clinic for assessment of the effectiveness of treatment.  She did not report any change in her pain level after the botulinumtoxinA injection procedure.    Abril Salazar MD  Pager: [phone]    Neurology Department  [facility] [address] [address]

## 2021-10-08 ENCOUNTER — OFFICE VISIT (OUTPATIENT)
Dept: NEUROLOGY | Facility: CLINIC | Age: 26
End: 2021-10-08
Payer: COMMERCIAL

## 2021-10-08 DIAGNOSIS — G43.709 CHRONIC MIGRAINE WITHOUT AURA WITHOUT STATUS MIGRAINOSUS, NOT INTRACTABLE: Primary | ICD-10-CM

## 2021-10-08 PROCEDURE — 64615 CHEMODENERV MUSC MIGRAINE: CPT | Performed by: PSYCHIATRY & NEUROLOGY

## 2021-10-08 NOTE — PROGRESS NOTES
[place], [place]  Botulinum Toxin Procedure    Abril Salazar MD  Neurology    October 8, 2021    Procedure:  OnabotulinumtoxinA injections for chronic migraine  Indication:  Chronic migraine    Ms. Varela suffers from severe intractable headaches.  She was referred by Dr. Salazar for onabotulinumtoxinA injections for headache.  Risks, benefits, and alternatives were discussed.  All questions were answered and consent given.  She decided to proceed with the injections.     Prior to initiation of botulinum toxin injections, Ms. Varela reported 15-20 headache days per month, with 15-20 severe headache days per month. Her headaches are quite disabling and often interfere with her ability to function normally.    Ms. Varela reports 8 headache days per month currently, with 4 severe headache days per month.  She has noticed a wearing off phenomenon prior to this round of botulinum toxin injections, lasting 2 weeks.    She has attempted other migraine prophylactic treatments in the past, which have included:  topiramate, venlafaxine, and gabapentin.  She is also tried nortriptyline in the past which caused side effects and was stopped.  She currently takes Spironolactone for other reasons, and this is not been helpful for headache.  She has a history of dizziness, and I did not recommend a beta-blocker out of fear of worsening this.  She also takes co-Q10 supplements, which are not clearly effective.         She currently takes CoQ10, topiramate for headache prevention. She is going to trial tapering topiramate.     Ms. Varela's pain was assessed prior to the procedure.  She rated her pain today as 4-5 out of 10.    Procedural Pause: Procedural pause was conducted to verify correct patient identity, procedure to be performed, correct side and site, correct patient position, and special requirements. Appropriate hand hygiene was utilized, and each injection site was prepped with alcohol wipes or  Chloraprep swab.     Procedure Details: 200 units of onabotulinumtoxinA was diluted in 4 mL 0.9% normal saline. A total of 150 units of onabotulinumtoxinA were injected using 30 gauge 0.5 in needles into the muscles listed below. 50 units of onabotulinumtoxinA were wasted.     Injection Sites: Total = 150 units onabotulinumtoxinA      and Procerus muscles - 5 units into the left and right corrugators and 5 units into the procerus (15 units total)    Frontalis muscles - 5 units into the left superior frontalis and 5 units into the right superior frontalis (2 injection sites per muscle) (10 units total)    Temporalis muscles - 12.5 units into the left temporalis muscle and 12.5 units into the right temporalis muscle (2 injection sites per muscle) (25 units total)    Occipitalis muscles - 12.5 units into the left occipitalis muscle and 12.5 units into the right occipitalis muscle (2 injection sites per muscle) (25 units total)    Splenius Capitis muscles - 12.5 units into the left splenius capitis muscle and 12.5 units into the right splenius capitis muscle (2 injection sites per muscle, divided into 2/3 anteriorly and 1/3 posteriorly) (25 units total)      Trapezius muscles - 25 units into the left trapezius muscle and 25 units into the right trapezius muscle (3 injection sites per muscle, divided 5 units, 10 units, 10 units, medial to lateral) (50 units total)    Ms. Varela tolerated the procedure well without immediate complications.  She will follow up in clinic for assessment of the effectiveness of treatment.  She did not report any change in her pain level after the botulinumtoxinA injection procedure.    Abril Salazar MD  Pager: [phone]    Neurology Department  [facility] [address] [address]

## 2021-10-08 NOTE — LETTER
10/8/2021       RE: Carito Varela  [address]  [facility] [address]     Dear Colleague,    Thank you for referring your patient, Carito Varela, to the [facility] [place] at [facility]. Please see a copy of my visit note below.    [place], [place]  Botulinum Toxin Procedure    Abril Salazar MD  Neurology    October 8, 2021    Procedure:  OnabotulinumtoxinA injections for chronic migraine  Indication:  Chronic migraine    Ms. Varela suffers from severe intractable headaches.  She was referred by Dr. Salazar for onabotulinumtoxinA injections for headache.  Risks, benefits, and alternatives were discussed.  All questions were answered and consent given.  She decided to proceed with the injections.     Prior to initiation of botulinum toxin injections, Ms. Varela reported 15-20 headache days per month, with 15-20 severe headache days per month. Her headaches are quite disabling and often interfere with her ability to function normally.    Ms. Varela reports 8 headache days per month currently, with 4 severe headache days per month.  She has noticed a wearing off phenomenon prior to this round of botulinum toxin injections, lasting 2 weeks.    She has attempted other migraine prophylactic treatments in the past, which have included:  topiramate, venlafaxine, and gabapentin.  She is also tried nortriptyline in the past which caused side effects and was stopped.  She currently takes Spironolactone for other reasons, and this is not been helpful for headache.  She has a history of dizziness, and I did not recommend a beta-blocker out of fear of worsening this.  She also takes co-Q10 supplements, which are not clearly effective.         She currently takes CoQ10, topiramate for headache prevention. She is going to trial tapering topiramate.     Ms. Varela's pain was assessed prior to the procedure.  She rated her  pain today as 4-5 out of 10.    Procedural Pause: Procedural pause was conducted to verify correct patient identity, procedure to be performed, correct side and site, correct patient position, and special requirements. Appropriate hand hygiene was utilized, and each injection site was prepped with alcohol wipes or Chloraprep swab.     Procedure Details: 200 units of onabotulinumtoxinA was diluted in 4 mL 0.9% normal saline. A total of 150 units of onabotulinumtoxinA were injected using 30 gauge 0.5 in needles into the muscles listed below. 50 units of onabotulinumtoxinA were wasted.     Injection Sites: Total = 150 units onabotulinumtoxinA      and Procerus muscles - 5 units into the left and right corrugators and 5 units into the procerus (15 units total)    Frontalis muscles - 5 units into the left superior frontalis and 5 units into the right superior frontalis (2 injection sites per muscle) (10 units total)    Temporalis muscles - 12.5 units into the left temporalis muscle and 12.5 units into the right temporalis muscle (2 injection sites per muscle) (25 units total)    Occipitalis muscles - 12.5 units into the left occipitalis muscle and 12.5 units into the right occipitalis muscle (2 injection sites per muscle) (25 units total)    Splenius Capitis muscles - 12.5 units into the left splenius capitis muscle and 12.5 units into the right splenius capitis muscle (2 injection sites per muscle, divided into 2/3 anteriorly and 1/3 posteriorly) (25 units total)      Trapezius muscles - 25 units into the left trapezius muscle and 25 units into the right trapezius muscle (3 injection sites per muscle, divided 5 units, 10 units, 10 units, medial to lateral) (50 units total)    Ms. Varela tolerated the procedure well without immediate complications.  She will follow up in clinic for assessment of the effectiveness of treatment.  She did not report any change in her pain level after the botulinumtoxinA injection  procedure.    Abril Salazar MD  Pager: [phone]    Neurology Department  [facility] [place]  [address] [place] [address]

## 2022-01-06 ENCOUNTER — OFFICE VISIT (OUTPATIENT)
Dept: NEUROLOGY | Facility: CLINIC | Age: 27
End: 2022-01-06
Payer: COMMERCIAL

## 2022-01-06 DIAGNOSIS — G43.009 MIGRAINE WITHOUT AURA AND WITHOUT STATUS MIGRAINOSUS, NOT INTRACTABLE: ICD-10-CM

## 2022-01-06 DIAGNOSIS — G43.709 CHRONIC MIGRAINE WITHOUT AURA WITHOUT STATUS MIGRAINOSUS, NOT INTRACTABLE: Primary | ICD-10-CM

## 2022-01-06 PROCEDURE — 64615 CHEMODENERV MUSC MIGRAINE: CPT | Mod: GC | Performed by: PSYCHIATRY & NEUROLOGY

## 2022-01-06 NOTE — LETTER
1/6/2022       RE: Carito Varela  [address]  [address]  [facility] [address]     Dear Colleague,    Thank you for referring your patient, Cartio Varela, to the [facility]IC [place] at [facility]. Please see a copy of my visit note below.    [place], [place]  Botulinum Toxin Procedure    January 6, 2022    Procedure:  OnabotulinumtoxinA injections for chronic migraine  Indication:  Chronic migraine    Ms. Varela suffers from severe intractable headaches.  She returns today after having had onabotulinumtoxinA injections on 10/8/2021.  She has noticed improvement in her headaches and wishes to have another set of injections.  Risks, benefits, and alternatives were discussed.  All questions were answered and consent given.  She decided to proceed with the injections.      Prior to onabotulinumtoxinA injections, Ms. Varela reported 15-20 headache days per month with 15-20 severe headache days per month.      Her current estimated headache frequency is 2 headache days per month, with 2 severe headache days per month. She has noticed a wearing off phenomenon prior to this round of botulinum toxin injections, lasting <2 weeks. Her headaches are quite disabling and often interfere with her ability to function normally.      She has attempted other migraine prophylactic treatments in the past, which have included:  topiramate, venlafaxine, and gabapentin.  She is also tried nortriptyline in the past which caused side effects and was stopped.  She currently takes Spironolactone for other reasons, and this is not been helpful for headache.  She has a history of dizziness, and I did not recommend a beta-blocker out of fear of worsening this.  She also takes co-Q10 supplements, which are not clearly effective.         She currently takes CoQ10, topiramate for headache prevention.     Ms. Varela's pain was assessed prior to the  procedure.  She rated her pain today as 0 out of 10.    Procedural Pause: Procedural pause was conducted to verify correct patient identity, procedure to be performed, correct side and site, correct patient position, and special requirements.    Procedure Details: 200 units of onabotulinumtoxinA was diluted in 4 mL 0.9% normal saline. A total of 150 units of onabotulinumtoxinA were injected using 30 gauge 0.5 in needles into the muscles listed below. 50 units of onabotulinumtoxinA were wasted.     Injection Sites: 150 units onabotulinumtoxinA = Total dose     and Procerus muscles - 5 units into the left and right corrugators and 5 units into the procerus (15 units total)    Frontalis muscles - 5 units into the left superior frontalis and 5 units into the right superior frontalis (2 injection sites per muscle) (10 units total)    Temporalis muscles - 12.5 units into the left temporalis muscle and 12.5 units into the right temporalis muscle (2 injection sites per muscle) (25 units total)    Occipitalis muscles - 12.5 units into the left occipitalis muscle and 12.5 units into the right occipitalis muscle (2 injection sites per muscle) (25 units total)    Splenius Capitis muscles - 12.5 units into the left splenius capitis muscle and 12.5 units into the right splenius capitis muscle (2 injection sites per muscle) (25 units total)      Upper Trapezius muscles - 25 units into the left trapezius muscle and 25 units into the right trapezius muscle (3 injection sites per muscle, divided 5 units, 10 units, 10 units, medial to lateral) (50 units total)    Ms. Varela tolerated the procedure well without immediate complications.  She will follow up in clinic for assessment of the effectiveness of treatment.  She did not report any change in her pain level after the botulinumtoxinA injection procedure.    Performed with the attending neurologist, Dr. Fisher.    Karo Schmitz MD  Neurology PGY-3       I was present for  the entire Botulinum toxin injection performed on 01/06/22 and reported by Dr. Schmitz and was available to take over any critical portions of the exam as needed.  I agree entirely of the report and impression as recorded by Dr. Schmitz.    Follow up in 3 months with Dr. Salazar for evaluation of repeat onabotulinumtoxin A injections.      Aleksandra Fisher DO   of Neurology   [facility]

## 2022-01-06 NOTE — PROGRESS NOTES
[place], [place]  Botulinum Toxin Procedure    January 6, 2022    Procedure:  OnabotulinumtoxinA injections for chronic migraine  Indication:  Chronic migraine    Ms. Varela suffers from severe intractable headaches.  She returns today after having had onabotulinumtoxinA injections on 10/8/2021.  She has noticed improvement in her headaches and wishes to have another set of injections.  Risks, benefits, and alternatives were discussed.  All questions were answered and consent given.  She decided to proceed with the injections.      Prior to onabotulinumtoxinA injections, Ms. Varela reported 15-20 headache days per month with 15-20 severe headache days per month.      Her current estimated headache frequency is 2 headache days per month, with 2 severe headache days per month. She has noticed a wearing off phenomenon prior to this round of botulinum toxin injections, lasting <2 weeks. Her headaches are quite disabling and often interfere with her ability to function normally.      She has attempted other migraine prophylactic treatments in the past, which have included:  topiramate, venlafaxine, and gabapentin.  She is also tried nortriptyline in the past which caused side effects and was stopped.  She currently takes Spironolactone for other reasons, and this is not been helpful for headache.  She has a history of dizziness, and I did not recommend a beta-blocker out of fear of worsening this.  She also takes co-Q10 supplements, which are not clearly effective.         She currently takes CoQ10, topiramate for headache prevention.     Ms. Varela's pain was assessed prior to the procedure.  She rated her pain today as 0 out of 10.    Procedural Pause: Procedural pause was conducted to verify correct patient identity, procedure to be performed, correct side and site, correct patient position, and special requirements.    Procedure Details: 200 units of onabotulinumtoxinA was diluted in 4 mL  0.9% normal saline. A total of 150 units of onabotulinumtoxinA were injected using 30 gauge 0.5 in needles into the muscles listed below. 50 units of onabotulinumtoxinA were wasted.     Injection Sites: 150 units onabotulinumtoxinA = Total dose     and Procerus muscles - 5 units into the left and right corrugators and 5 units into the procerus (15 units total)    Frontalis muscles - 5 units into the left superior frontalis and 5 units into the right superior frontalis (2 injection sites per muscle) (10 units total)    Temporalis muscles - 12.5 units into the left temporalis muscle and 12.5 units into the right temporalis muscle (2 injection sites per muscle) (25 units total)    Occipitalis muscles - 12.5 units into the left occipitalis muscle and 12.5 units into the right occipitalis muscle (2 injection sites per muscle) (25 units total)    Splenius Capitis muscles - 12.5 units into the left splenius capitis muscle and 12.5 units into the right splenius capitis muscle (2 injection sites per muscle) (25 units total)      Upper Trapezius muscles - 25 units into the left trapezius muscle and 25 units into the right trapezius muscle (3 injection sites per muscle, divided 5 units, 10 units, 10 units, medial to lateral) (50 units total)    Ms. Varela tolerated the procedure well without immediate complications.  She will follow up in clinic for assessment of the effectiveness of treatment.  She did not report any change in her pain level after the botulinumtoxinA injection procedure.    Performed with the attending neurologist, Dr. Fisher.    Karo Schmitz MD  Neurology PGY-3       I was present for the entire Botulinum toxin injection performed on 01/06/22 and reported by Dr. Schmitz and was available to take over any critical portions of the exam as needed.  I agree entirely of the report and impression as recorded by Dr. Schmitz.    Follow up in 3 months with Dr. Salazar for evaluation of repeat onabotulinumtoxin A  injections.      Aleksandra Fisher DO   of Neurology   [facility]

## 2022-04-06 DIAGNOSIS — G43.009 MIGRAINE WITHOUT AURA AND WITHOUT STATUS MIGRAINOSUS, NOT INTRACTABLE: ICD-10-CM

## 2022-04-06 RX ORDER — TOPIRAMATE 25 MG/1
TABLET, FILM COATED ORAL
Qty: 180 TABLET | Refills: 3 | Status: SHIPPED | OUTPATIENT
Start: 2022-04-06 | End: 2023-04-03

## 2022-04-09 ENCOUNTER — HEALTH MAINTENANCE LETTER (OUTPATIENT)
Age: 27
End: 2022-04-09

## 2022-04-11 ENCOUNTER — OFFICE VISIT (OUTPATIENT)
Dept: NEUROLOGY | Facility: CLINIC | Age: 27
End: 2022-04-11
Payer: COMMERCIAL

## 2022-04-11 DIAGNOSIS — G43.709 CHRONIC MIGRAINE WITHOUT AURA WITHOUT STATUS MIGRAINOSUS, NOT INTRACTABLE: Primary | ICD-10-CM

## 2022-04-11 PROCEDURE — 64615 CHEMODENERV MUSC MIGRAINE: CPT | Performed by: PSYCHIATRY & NEUROLOGY

## 2022-04-11 NOTE — PROGRESS NOTES
[place], [place]  Botulinum Toxin Procedure    Abril Salazar MD  Neurology    April 11, 2022    Procedure:  OnabotulinumtoxinA injections for chronic migraine  Indication:  Chronic migraine    Ms. Varela suffers from severe intractable headaches.  She was referred by Dr. Salazar for onabotulinumtoxinA injections for headache.  Risks, benefits, and alternatives were discussed.  All questions were answered and consent given.  She decided to proceed with the injections.     Prior to initiation of botulinum toxin injections, Ms. Varlea reported 15-20 headache days per month, with 15-20 severe headache days per month. Her headaches are quite disabling and often interfere with her ability to function normally.    Ms. Varela reports 8 headache days per month currently, with 4 severe headache days per month.  She has noticed a wearing off phenomenon prior to this round of botulinum toxin injections, lasting 2 weeks.    She has attempted other migraine prophylactic treatments in the past, which have included:  topiramate, venlafaxine, and gabapentin.  She is also tried nortriptyline in the past which caused side effects and was stopped.  She currently takes Spironolactone for other reasons, and this is not been helpful for headache.  She has a history of dizziness, and I did not recommend a beta-blocker out of fear of worsening this.  She also takes co-Q10 supplements, which are not clearly effective.         She currently takes CoQ10, topiramate for headache prevention. She is going to trial tapering topiramate.    Procedural Pause: Procedural pause was conducted to verify correct patient identity, procedure to be performed, correct side and site, correct patient position, and special requirements. Appropriate hand hygiene was utilized, and each injection site was prepped with alcohol wipes or Chloraprep swab.     Procedure Details: 200 units of onabotulinumtoxinA was diluted in 4 mL 0.9% normal  saline. A total of 150 units of onabotulinumtoxinA were injected using 30 gauge 0.5 in needles into the muscles listed below. 50 units of onabotulinumtoxinA were wasted.     Injection Sites: Total = 150 units onabotulinumtoxinA      and Procerus muscles - 5 units into the left and right corrugators and 5 units into the procerus (15 units total)    Frontalis muscles - 5 units into the left superior frontalis and 5 units into the right superior frontalis (2 injection sites per muscle) (10 units total)    Temporalis muscles - 12.5 units into the left temporalis muscle and 12.5 units into the right temporalis muscle (2 injection sites per muscle) (25 units total)    Occipitalis muscles - 12.5 units into the left occipitalis muscle and 12.5 units into the right occipitalis muscle (2 injection sites per muscle) (25 units total)    Splenius Capitis muscles - 12.5 units into the left splenius capitis muscle and 12.5 units into the right splenius capitis muscle (2 injection sites per muscle, divided into 2/3 anteriorly and 1/3 posteriorly) (25 units total)      Trapezius muscles - 25 units into the left trapezius muscle and 25 units into the right trapezius muscle (3 injection sites per muscle, divided 5 units, 10 units, 10 units, medial to lateral) (50 units total)    Ms. Varela tolerated the procedure well without immediate complications.  She will follow up in clinic for assessment of the effectiveness of treatment.  She did not report any change in her pain level after the botulinumtoxinA injection procedure.    Abril Salazar MD    Neurology Department  [facility] [place] [id number]

## 2022-04-11 NOTE — LETTER
4/11/2022       RE: Carito Varela  [address]  [address]  [facility] [address]     Dear Colleague,    Thank you for referring your patient, Carito Varela, to the [facility]OLOG[facility]. Please see a copy of my visit note below.      April 11, 2022    Procedure:  OnabotulinumtoxinA injections for chronic migraine  Indication:  Chronic migraine    Ms. Varela suffers from severe intractable headaches.  She was referred by Dr. Salazar for onabotulinumtoxinA injections for headache.  Risks, benefits, and alternatives were discussed.  All questions were answered and consent given.  She decided to proceed with the injections.     Prior to initiation of botulinum toxin injections, Ms. Varela reported 15-20 headache days per month, with 15-20 severe headache days per month. Her headaches are quite disabling and often interfere with her ability to function normally.    Ms. Varela reports 8 headache days per month currently, with 4 severe headache days per month.  She has noticed a wearing off phenomenon prior to this round of botulinum toxin injections, lasting 2 weeks.    She has attempted other migraine prophylactic treatments in the past, which have included:  topiramate, venlafaxine, and gabapentin.  She is also tried nortriptyline in the past which caused side effects and was stopped.  She currently takes Spironolactone for other reasons, and this is not been helpful for headache.  She has a history of dizziness, and I did not recommend a beta-blocker out of fear of worsening this.  She also takes co-Q10 supplements, which are not clearly effective.         She currently takes CoQ10, topiramate for headache prevention. She is going to trial tapering topiramate.    Procedural Pause: Procedural pause was conducted to verify correct patient identity, procedure to be performed, correct side and site, correct patient position, and special  requirements. Appropriate hand hygiene was utilized, and each injection site was prepped with alcohol wipes or Chloraprep swab.     Procedure Details: 200 units of onabotulinumtoxinA was diluted in 4 mL 0.9% normal saline. A total of 150 units of onabotulinumtoxinA were injected using 30 gauge 0.5 in needles into the muscles listed below. 50 units of onabotulinumtoxinA were wasted.     Injection Sites: Total = 150 units onabotulinumtoxinA      and Procerus muscles - 5 units into the left and right corrugators and 5 units into the procerus (15 units total)    Frontalis muscles - 5 units into the left superior frontalis and 5 units into the right superior frontalis (2 injection sites per muscle) (10 units total)    Temporalis muscles - 12.5 units into the left temporalis muscle and 12.5 units into the right temporalis muscle (2 injection sites per muscle) (25 units total)    Occipitalis muscles - 12.5 units into the left occipitalis muscle and 12.5 units into the right occipitalis muscle (2 injection sites per muscle) (25 units total)    Splenius Capitis muscles - 12.5 units into the left splenius capitis muscle and 12.5 units into the right splenius capitis muscle (2 injection sites per muscle, divided into 2/3 anteriorly and 1/3 posteriorly) (25 units total)      Trapezius muscles - 25 units into the left trapezius muscle and 25 units into the right trapezius muscle (3 injection sites per muscle, divided 5 units, 10 units, 10 units, medial to lateral) (50 units total)    Ms. Varela tolerated the procedure well without immediate complications.  She will follow up in clinic for assessment of the effectiveness of treatment.  She did not report any change in her pain level after the botulinumtoxinA injection procedure.      Abril Salazar MD    Neurology Department  [facility] [place] [address]

## 2022-07-06 ENCOUNTER — OFFICE VISIT (OUTPATIENT)
Dept: NEUROLOGY | Facility: CLINIC | Age: 27
End: 2022-07-06
Payer: COMMERCIAL

## 2022-07-06 DIAGNOSIS — G43.719 INTRACTABLE CHRONIC MIGRAINE WITHOUT AURA AND WITHOUT STATUS MIGRAINOSUS: Primary | ICD-10-CM

## 2022-07-06 PROCEDURE — 99207 PR SATISFY VISIT NUMBER: CPT | Performed by: PSYCHIATRY & NEUROLOGY

## 2022-07-06 PROCEDURE — 64615 CHEMODENERV MUSC MIGRAINE: CPT | Performed by: PSYCHIATRY & NEUROLOGY

## 2022-07-06 NOTE — LETTER
7/6/2022       RE: Carito Varela  [address]  [address]  [facility] [address]     Dear Colleague,    Thank you for referring your patient, Carito Varela, to the [facility]UROLOGY CL[facility]. Please see a copy of my visit note below.         Physicians    Carito Varela MRN# [id number]   Age: 27 year old YOB: 1995        Procedure:  OnabotulinumtoxinA injections for chronic migraine  Indication:  Chronic migraine     Ms. Varela suffers from severe intractable headaches.  She was referred by Dr. Salazar for onabotulinumtoxinA injections for headache.  Risks, benefits, and alternatives were discussed.  All questions were answered and consent given.  She decided to proceed with the injections.      Prior to initiation of botulinum toxin injections, Ms. Varela reported 15-20 headache days per month, with 15-20 severe headache days per month. Her headaches are quite disabling and often interfere with her ability to function normally.     Ms. Varela reports 3 headache days per month currently, with 2-3 severe headache days per month.  She has noticed a wearing off phenomenon prior to this round of botulinum toxin injections, lasting 2 weeks.     She has attempted other migraine prophylactic treatments in the past, which have included:  topiramate, venlafaxine, and gabapentin.  She is also tried nortriptyline in the past which caused side effects and was stopped.  She currently takes Spironolactone for other reasons, and this is not been helpful for headache.  She has a history of dizziness, and I did not recommend a beta-blocker out of fear of worsening this.  She also takes co-Q10 supplements, which are not clearly effective.         She currently takes CoQ10, topiramate for headache prevention. She is going to trial tapering topiramate.     Procedural Pause: Procedural pause was conducted to verify correct patient identity,  procedure to be performed, correct side and site, correct patient position, and special requirements. Appropriate hand hygiene was utilized, and each injection site was prepped with alcohol wipes or Chloraprep swab.      Procedure Details: 200 units of onabotulinumtoxinA was diluted in 4 mL 0.9% normal saline. A total of 150 units of onabotulinumtoxinA were injected using 30 gauge 0.5 in needles into the muscles listed below. 50 units of onabotulinumtoxinA were wasted.      Injection Sites: Total = 150 units onabotulinumtoxinA       and Procerus muscles - 5 units into the left and right corrugators and 5 units into the procerus (15 units total)     Frontalis muscles - 5 units into the left superior frontalis and 5 units into the right superior frontalis (2 injection sites per muscle) (10 units total)     Temporalis muscles - 12.5 units into the left temporalis muscle and 12.5 units into the right temporalis muscle (2 injection sites per muscle) (25 units total)     Occipitalis muscles - 12.5 units into the left occipitalis muscle and 12.5 units into the right occipitalis muscle (2 injection sites per muscle) (25 units total)     Splenius Capitis muscles - 12.5 units into the left splenius capitis muscle and 12.5 units into the right splenius capitis muscle (2 injection sites per muscle, divided into 2/3 anteriorly and 1/3 posteriorly) (25 units total)                 Trapezius muscles - 25 units into the left trapezius muscle and 25 units into the right trapezius muscle (3 injection sites per muscle, divided 5 units, 10 units, 10 units, medial to lateral) (50 units total)     Ms. Varela tolerated the procedure well without immediate complications.  She will follow up in clinic for assessment of the effectiveness of treatment.  She did not report any change in her pain level after the botulinumtoxinA injection procedure.      Elisa Wen MD

## 2022-07-06 NOTE — PROGRESS NOTES
[facility]    Carito Varela MRN# [id number]   Age: 27 year old YOB: 1995        Procedure:  OnabotulinumtoxinA injections for chronic migraine  Indication:  Chronic migraine     Ms. Varela suffers from severe intractable headaches.  She was referred by Dr. Salazar for onabotulinumtoxinA injections for headache.  Risks, benefits, and alternatives were discussed.  All questions were answered and consent given.  She decided to proceed with the injections.      Prior to initiation of botulinum toxin injections, Ms. Varela reported 15-20 headache days per month, with 15-20 severe headache days per month. Her headaches are quite disabling and often interfere with her ability to function normally.     Ms. Varela reports 3 headache days per month currently, with 2-3 severe headache days per month.  She has noticed a wearing off phenomenon prior to this round of botulinum toxin injections, lasting 2 weeks.     She has attempted other migraine prophylactic treatments in the past, which have included:  topiramate, venlafaxine, and gabapentin.  She is also tried nortriptyline in the past which caused side effects and was stopped.  She currently takes Spironolactone for other reasons, and this is not been helpful for headache.  She has a history of dizziness, and I did not recommend a beta-blocker out of fear of worsening this.  She also takes co-Q10 supplements, which are not clearly effective.         She currently takes CoQ10, topiramate for headache prevention. She is going to trial tapering topiramate.     Procedural Pause: Procedural pause was conducted to verify correct patient identity, procedure to be performed, correct side and site, correct patient position, and special requirements. Appropriate hand hygiene was utilized, and each injection site was prepped with alcohol wipes or Chloraprep swab.      Procedure Details: 200 units of onabotulinumtoxinA was diluted in 4 mL 0.9% normal saline. A total of  150 units of onabotulinumtoxinA were injected using 30 gauge 0.5 in needles into the muscles listed below. 50 units of onabotulinumtoxinA were wasted.      Injection Sites: Total = 150 units onabotulinumtoxinA       and Procerus muscles - 5 units into the left and right corrugators and 5 units into the procerus (15 units total)     Frontalis muscles - 5 units into the left superior frontalis and 5 units into the right superior frontalis (2 injection sites per muscle) (10 units total)     Temporalis muscles - 12.5 units into the left temporalis muscle and 12.5 units into the right temporalis muscle (2 injection sites per muscle) (25 units total)     Occipitalis muscles - 12.5 units into the left occipitalis muscle and 12.5 units into the right occipitalis muscle (2 injection sites per muscle) (25 units total)     Splenius Capitis muscles - 12.5 units into the left splenius capitis muscle and 12.5 units into the right splenius capitis muscle (2 injection sites per muscle, divided into 2/3 anteriorly and 1/3 posteriorly) (25 units total)                 Trapezius muscles - 25 units into the left trapezius muscle and 25 units into the right trapezius muscle (3 injection sites per muscle, divided 5 units, 10 units, 10 units, medial to lateral) (50 units total)     Ms. Varela tolerated the procedure well without immediate complications.  She will follow up in clinic for assessment of the effectiveness of treatment.  She did not report any change in her pain level after the botulinumtoxinA injection procedure.    Elisa Wen MD

## 2022-07-07 PROBLEM — G43.009 MIGRAINE WITHOUT AURA AND WITHOUT STATUS MIGRAINOSUS, NOT INTRACTABLE: Status: RESOLVED | Noted: 2018-07-27 | Resolved: 2022-07-07

## 2022-07-07 PROBLEM — G43.719 INTRACTABLE CHRONIC MIGRAINE WITHOUT AURA AND WITHOUT STATUS MIGRAINOSUS: Status: ACTIVE | Noted: 2022-07-07

## 2022-10-03 ENCOUNTER — OFFICE VISIT (OUTPATIENT)
Dept: NEUROLOGY | Facility: CLINIC | Age: 27
End: 2022-10-03
Payer: COMMERCIAL

## 2022-10-03 DIAGNOSIS — G43.709 CHRONIC MIGRAINE WITHOUT AURA WITHOUT STATUS MIGRAINOSUS, NOT INTRACTABLE: Primary | ICD-10-CM

## 2022-10-03 PROCEDURE — 64615 CHEMODENERV MUSC MIGRAINE: CPT | Performed by: PSYCHIATRY & NEUROLOGY

## 2022-10-03 NOTE — PROGRESS NOTES
[facility]  Botulinum Toxin Procedure    Abril Salazar MD  Headache Neurology    October 3, 2022    Procedure:  OnabotulinumtoxinA injections for chronic migraine  Indication:  Chronic migraine    Ms. Varela suffers from severe intractable headaches.  She was referred by Dr. Salazar for onabotulinumtoxinA injections for headache.  Risks, benefits, and alternatives were discussed.  All questions were answered and consent given.  She decided to proceed with the injections.      Prior to initiation of botulinum toxin injections, Ms. Varela reported 15-20 headache days per month, with 15-20 severe headache days per month. Her headaches are quite disabling and often interfere with her ability to function normally.     Ms. Varela reports 3 headache days per month currently, with 2-3 severe headache days per month.  She has noticed a wearing off phenomenon prior to this round of botulinum toxin injections, lasting 4 weeks.    Due to significant wearing off, we decided to increase dosing to 200 units.     She has attempted other migraine prophylactic treatments in the past, which have included:  topiramate, venlafaxine, and gabapentin.  She is also tried nortriptyline in the past which caused side effects and was stopped.  She currently takes Spironolactone for other reasons, and this is not been helpful for headache.  She has a history of dizziness, and I did not recommend a beta-blocker out of fear of worsening this.  She also takes co-Q10 supplements, which are not clearly effective.         She currently takes CoQ10, topiramate for headache prevention. She is going to trial tapering topiramate.    Ms. Varela's pain was assessed prior to the procedure.  She rated her pain today as 2 out of 10.    Procedural Pause: Procedural pause was conducted to verify correct patient identity, procedure to be performed, correct side and site, correct patient position, and special requirements. Appropriate hand hygiene was utilized,  and each injection site was prepped with alcohol wipes or Chloraprep swab.     Procedure Details: 200 units of onabotulinumtoxinA was diluted in 4 mL 0.9% normal saline. A total of 200 units of onabotulinumtoxinA were injected using 30 gauge 0.5 in needles into the muscles listed below. 0 units of onabotulinumtoxinA were wasted.     Injection Sites: Total = 200 units onabotulinumtoxinA      and Procerus muscles - 5 units into the left and right corrugators and 5 units into the procerus (15 units total)    Frontalis muscles - 5 units into the left superior frontalis and 5 units into the right superior frontalis (2 injection sites per muscle) (10 units total)    Temporalis muscles - 25 units into the left temporalis muscle and 25 units into the right temporalis muscle (3 injection sites per muscle) (25 units total)    Occipitalis muscles - 25 units into the left occipitalis muscle and 25 units into the right occipitalis muscle (3 injection sites per muscle) (25 units total)    Splenius Capitis muscles - 12.5 units into the left splenius capitis muscle and 12.5 units into the right splenius capitis muscle (2 injection sites per muscle, divided into 2/3 anteriorly and 1/3 posteriorly) (25 units total)      Trapezius muscles - 25 units into the left trapezius muscle and 25 units into the right trapezius muscle (3 injection sites per muscle, divided 5 units, 10 units, 10 units, medial to lateral) (50 units total)    Ms. Varela tolerated the procedure well without immediate complications.  She will follow up in clinic for assessment of the effectiveness of treatment.  She did not report any change in her pain level after the botulinumtoxinA injection procedure.    Abril Salazar MD  Headache Neurology  [facility]

## 2022-10-03 NOTE — LETTER
10/3/2022       RE: Carito Varela  [address]  [address]  [facility] [address]     Dear Colleague,    Thank you for referring your patient, Carito Varela, to the [facility] NEUROLOGY CLINIC [place] at [facility]. Please see a copy of my visit note below.    [facility]  Botulinum Toxin Procedure    Abril Salazar MD  Headache Neurology    October 3, 2022    Procedure:  OnabotulinumtoxinA injections for chronic migraine  Indication:  Chronic migraine    Ms. Varela suffers from severe intractable headaches.  She was referred by Dr. Salazar for onabotulinumtoxinA injections for headache.  Risks, benefits, and alternatives were discussed.  All questions were answered and consent given.  She decided to proceed with the injections.      Prior to initiation of botulinum toxin injections, Ms. Varela reported 15-20 headache days per month, with 15-20 severe headache days per month. Her headaches are quite disabling and often interfere with her ability to function normally.     Ms. Varela reports 3 headache days per month currently, with 2-3 severe headache days per month.  She has noticed a wearing off phenomenon prior to this round of botulinum toxin injections, lasting 4 weeks.    Due to significant wearing off, we decided to increase dosing to 200 units.     She has attempted other migraine prophylactic treatments in the past, which have included:  topiramate, venlafaxine, and gabapentin.  She is also tried nortriptyline in the past which caused side effects and was stopped.  She currently takes Spironolactone for other reasons, and this is not been helpful for headache.  She has a history of dizziness, and I did not recommend a beta-blocker out of fear of worsening this.  She also takes co-Q10 supplements, which are not clearly effective.         She currently takes CoQ10, topiramate for headache prevention. She is going to trial tapering topiramate.    Ms.  Nichole's pain was assessed prior to the procedure.  She rated her pain today as 2 out of 10.    Procedural Pause: Procedural pause was conducted to verify correct patient identity, procedure to be performed, correct side and site, correct patient position, and special requirements. Appropriate hand hygiene was utilized, and each injection site was prepped with alcohol wipes or Chloraprep swab.     Procedure Details: 200 units of onabotulinumtoxinA was diluted in 4 mL 0.9% normal saline. A total of 200 units of onabotulinumtoxinA were injected using 30 gauge 0.5 in needles into the muscles listed below. 0 units of onabotulinumtoxinA were wasted.     Injection Sites: Total = 200 units onabotulinumtoxinA      and Procerus muscles - 5 units into the left and right corrugators and 5 units into the procerus (15 units total)    Frontalis muscles - 5 units into the left superior frontalis and 5 units into the right superior frontalis (2 injection sites per muscle) (10 units total)    Temporalis muscles - 25 units into the left temporalis muscle and 25 units into the right temporalis muscle (3 injection sites per muscle) (25 units total)    Occipitalis muscles - 25 units into the left occipitalis muscle and 25 units into the right occipitalis muscle (3 injection sites per muscle) (25 units total)    Splenius Capitis muscles - 12.5 units into the left splenius capitis muscle and 12.5 units into the right splenius capitis muscle (2 injection sites per muscle, divided into 2/3 anteriorly and 1/3 posteriorly) (25 units total)      Trapezius muscles - 25 units into the left trapezius muscle and 25 units into the right trapezius muscle (3 injection sites per muscle, divided 5 units, 10 units, 10 units, medial to lateral) (50 units total)    Ms. Varela tolerated the procedure well without immediate complications.  She will follow up in clinic for assessment of the effectiveness of treatment.  She did not report any change  in her pain level after the botulinumtoxinA injection procedure.    Abril Salazar MD  Headache Neurology  [facility]

## 2022-10-20 ENCOUNTER — LAB REQUISITION (OUTPATIENT)
Dept: LAB | Facility: CLINIC | Age: 27
End: 2022-10-20
Payer: COMMERCIAL

## 2022-10-20 DIAGNOSIS — R87.619 UNSPECIFIED ABNORMAL CYTOLOGICAL FINDINGS IN SPECIMENS FROM CERVIX UTERI: ICD-10-CM

## 2022-10-20 PROCEDURE — 88305 TISSUE EXAM BY PATHOLOGIST: CPT | Mod: 26 | Performed by: PATHOLOGY

## 2022-10-20 PROCEDURE — G0145 SCR C/V CYTO,THINLAYER,RESCR: HCPCS | Mod: ORL | Performed by: OBSTETRICS & GYNECOLOGY

## 2022-10-20 PROCEDURE — 87624 HPV HI-RISK TYP POOLED RSLT: CPT | Mod: ORL | Performed by: OBSTETRICS & GYNECOLOGY

## 2022-10-20 PROCEDURE — 88305 TISSUE EXAM BY PATHOLOGIST: CPT | Mod: TC,ORL | Performed by: OBSTETRICS & GYNECOLOGY

## 2022-10-20 PROCEDURE — 88141 CYTOPATH C/V INTERPRET: CPT | Performed by: PATHOLOGY

## 2022-10-24 LAB
PATH REPORT.COMMENTS IMP SPEC: NORMAL
PATH REPORT.COMMENTS IMP SPEC: NORMAL
PATH REPORT.FINAL DX SPEC: NORMAL
PATH REPORT.GROSS SPEC: NORMAL
PATH REPORT.MICROSCOPIC SPEC OTHER STN: NORMAL
PATH REPORT.RELEVANT HX SPEC: NORMAL
PHOTO IMAGE: NORMAL

## 2022-10-25 LAB
BKR LAB AP GYN ADEQUACY: ABNORMAL
BKR LAB AP GYN INTERPRETATION: ABNORMAL
BKR LAB AP HPV REFLEX: ABNORMAL
BKR LAB AP LMP: ABNORMAL
BKR LAB AP PREVIOUS ABNL DX: ABNORMAL
BKR LAB AP PREVIOUS ABNORMAL: ABNORMAL
PATH REPORT.COMMENTS IMP SPEC: ABNORMAL
PATH REPORT.COMMENTS IMP SPEC: ABNORMAL
PATH REPORT.RELEVANT HX SPEC: ABNORMAL

## 2022-10-27 LAB
HUMAN PAPILLOMA VIRUS 16 DNA: NEGATIVE
HUMAN PAPILLOMA VIRUS 18 DNA: NEGATIVE
HUMAN PAPILLOMA VIRUS FINAL DIAGNOSIS: ABNORMAL
HUMAN PAPILLOMA VIRUS OTHER HR: POSITIVE

## 2022-12-22 ENCOUNTER — LAB REQUISITION (OUTPATIENT)
Dept: LAB | Facility: CLINIC | Age: 27
End: 2022-12-22
Payer: COMMERCIAL

## 2022-12-22 DIAGNOSIS — Z13.0 ENCOUNTER FOR SCREENING FOR DISEASES OF THE BLOOD AND BLOOD-FORMING ORGANS AND CERTAIN DISORDERS INVOLVING THE IMMUNE MECHANISM: ICD-10-CM

## 2022-12-22 DIAGNOSIS — E55.9 VITAMIN D DEFICIENCY, UNSPECIFIED: ICD-10-CM

## 2022-12-22 DIAGNOSIS — Z13.1 ENCOUNTER FOR SCREENING FOR DIABETES MELLITUS: ICD-10-CM

## 2022-12-22 DIAGNOSIS — Z13.220 ENCOUNTER FOR SCREENING FOR LIPOID DISORDERS: ICD-10-CM

## 2022-12-22 LAB — HGB BLD-MCNC: 15.2 G/DL (ref 11.7–15.7)

## 2022-12-22 PROCEDURE — 82947 ASSAY GLUCOSE BLOOD QUANT: CPT | Mod: ORL | Performed by: OBSTETRICS & GYNECOLOGY

## 2022-12-22 PROCEDURE — 80061 LIPID PANEL: CPT | Mod: ORL | Performed by: OBSTETRICS & GYNECOLOGY

## 2022-12-22 PROCEDURE — 85018 HEMOGLOBIN: CPT | Mod: ORL | Performed by: OBSTETRICS & GYNECOLOGY

## 2022-12-22 PROCEDURE — 82306 VITAMIN D 25 HYDROXY: CPT | Mod: ORL | Performed by: OBSTETRICS & GYNECOLOGY

## 2022-12-23 LAB — DEPRECATED CALCIDIOL+CALCIFEROL SERPL-MC: 49 UG/L (ref 20–75)

## 2022-12-26 LAB
CHOLEST SERPL-MCNC: 213 MG/DL
FASTING STATUS PATIENT QL REPORTED: YES
GLUCOSE SERPL-MCNC: 87 MG/DL (ref 70–99)
HDLC SERPL-MCNC: 50 MG/DL
LDLC SERPL CALC-MCNC: 140 MG/DL
NONHDLC SERPL-MCNC: 163 MG/DL
TRIGL SERPL-MCNC: 116 MG/DL

## 2023-01-06 ENCOUNTER — OFFICE VISIT (OUTPATIENT)
Dept: NEUROLOGY | Facility: CLINIC | Age: 28
End: 2023-01-06
Payer: COMMERCIAL

## 2023-01-06 DIAGNOSIS — G43.709 CHRONIC MIGRAINE WITHOUT AURA WITHOUT STATUS MIGRAINOSUS, NOT INTRACTABLE: Primary | ICD-10-CM

## 2023-01-06 PROCEDURE — 64615 CHEMODENERV MUSC MIGRAINE: CPT | Performed by: PSYCHIATRY & NEUROLOGY

## 2023-01-06 RX ORDER — METHYLPREDNISOLONE 4 MG
TABLET, DOSE PACK ORAL
Qty: 21 TABLET | Refills: 0 | Status: SHIPPED | OUTPATIENT
Start: 2023-01-06

## 2023-01-06 NOTE — PROGRESS NOTES
[facility]  Botulinum Toxin Procedure    Abril Salazar MD  Headache Neurology    January 6, 2023    Procedure:  OnabotulinumtoxinA injections for chronic migraine  Indication:  Chronic migraine    Ms. Varela suffers from severe intractable headaches.  She was referred by Dr. Salazar for onabotulinumtoxinA injections for headache.  Risks, benefits, and alternatives were discussed.  All questions were answered and consent given.  She decided to proceed with the injections.      Prior to initiation of botulinum toxin injections, Ms. Varela reported 15-20 headache days per month, with 15-20 severe headache days per month. Her headaches are quite disabling and often interfere with her ability to function normally.     Ms. Varela reports 3 headache days per month currently, with 2-3 severe headache days per month.  She has noticed a wearing off phenomenon prior to this round of botulinum toxin injections, lasting 3 weeks.     Due to significant wearing off, we decided to increase dosing to 200 units, after trialing standard dosing.     She has attempted other migraine prophylactic treatments in the past, which have included:  topiramate, venlafaxine, and gabapentin.  She is also tried nortriptyline in the past which caused side effects and was stopped.  She currently takes Spironolactone for other reasons, and this is not been helpful for headache.  She has a history of dizziness, and I did not recommend a beta-blocker out of fear of worsening this.  She also takes co-Q10 supplements, which are not clearly effective.         She currently takes CoQ10, topiramate for headache prevention. She is going to trial tapering topiramate.    Ms. Varela's pain was assessed prior to the procedure.  She rated her pain today as 2 out of 10.    Procedural Pause: Procedural pause was conducted to verify correct patient identity, procedure to be performed, correct side and site, correct patient position, and special requirements.  Appropriate hand hygiene was utilized, and each injection site was prepped with alcohol wipes or Chloraprep swab.     Procedure Details: 200 units of onabotulinumtoxinA was diluted in 4 mL 0.9% normal saline. A total of 200 units of onabotulinumtoxinA were injected using 30 gauge 0.5 in needles into the muscles listed below. 0 units of onabotulinumtoxinA were wasted.      Injection Sites: Total = 200 units onabotulinumtoxinA       and Procerus muscles - 5 units into the left and right corrugators and 5 units into the procerus (15 units total)     Frontalis muscles - 5 units into the left superior frontalis and 5 units into the right superior frontalis (2 injection sites per muscle) (10 units total)     Temporalis muscles - 25 units into the left temporalis muscle and 25 units into the right temporalis muscle (3 injection sites per muscle) (25 units total)     Occipitalis muscles - 25 units into the left occipitalis muscle and 25 units into the right occipitalis muscle (3 injection sites per muscle) (25 units total)     Splenius Capitis muscles - 12.5 units into the left splenius capitis muscle and 12.5 units into the right splenius capitis muscle (2 injection sites per muscle, divided into 2/3 anteriorly and 1/3 posteriorly) (25 units total)                 Trapezius muscles - 25 units into the left trapezius muscle and 25 units into the right trapezius muscle (3 injection sites per muscle, divided 5 units, 10 units, 10 units, medial to lateral) (50 units total)    Ms. Varela tolerated the procedure well without immediate complications.  She will follow up in clinic for assessment of the effectiveness of treatment.  She did not report any change in her pain level after the botulinumtoxinA injection procedure.    Abril Salazar MD  Headache Neurology  [facility]

## 2023-01-06 NOTE — LETTER
1/6/2023         RE: Carito Varela  [address]  [address]  [facility] [address]        Dear Colleague,    Thank you for referring your patient, aCrito Varela, to the [facility] NEUROLOGY CLINICS [facility]. Please see a copy of my visit note below.    [facility]  Botulinum Toxin Procedure    Abril Salazar MD  Headache Neurology    January 6, 2023    Procedure:  OnabotulinumtoxinA injections for chronic migraine  Indication:  Chronic migraine    Ms. Varela suffers from severe intractable headaches.  She was referred by Dr. Salazar for onabotulinumtoxinA injections for headache.  Risks, benefits, and alternatives were discussed.  All questions were answered and consent given.  She decided to proceed with the injections.      Prior to initiation of botulinum toxin injections, Ms. Varela reported 15-20 headache days per month, with 15-20 severe headache days per month. Her headaches are quite disabling and often interfere with her ability to function normally.     Ms. Varela reports 3 headache days per month currently, with 2-3 severe headache days per month.  She has noticed a wearing off phenomenon prior to this round of botulinum toxin injections, lasting 3 weeks.     Due to significant wearing off, we decided to increase dosing to 200 units, after trialing standard dosing.     She has attempted other migraine prophylactic treatments in the past, which have included:  topiramate, venlafaxine, and gabapentin.  She is also tried nortriptyline in the past which caused side effects and was stopped.  She currently takes Spironolactone for other reasons, and this is not been helpful for headache.  She has a history of dizziness, and I did not recommend a beta-blocker out of fear of worsening this.  She also takes co-Q10 supplements, which are not clearly effective.         She currently takes CoQ10, topiramate for headache prevention. She is going to trial tapering topiramate.    Ms. Varela's pain was assessed  prior to the procedure.  She rated her pain today as 2 out of 10.    Procedural Pause: Procedural pause was conducted to verify correct patient identity, procedure to be performed, correct side and site, correct patient position, and special requirements. Appropriate hand hygiene was utilized, and each injection site was prepped with alcohol wipes or Chloraprep swab.     Procedure Details: 200 units of onabotulinumtoxinA was diluted in 4 mL 0.9% normal saline. A total of 200 units of onabotulinumtoxinA were injected using 30 gauge 0.5 in needles into the muscles listed below. 0 units of onabotulinumtoxinA were wasted.      Injection Sites: Total = 200 units onabotulinumtoxinA       and Procerus muscles - 5 units into the left and right corrugators and 5 units into the procerus (15 units total)     Frontalis muscles - 5 units into the left superior frontalis and 5 units into the right superior frontalis (2 injection sites per muscle) (10 units total)     Temporalis muscles - 25 units into the left temporalis muscle and 25 units into the right temporalis muscle (3 injection sites per muscle) (25 units total)     Occipitalis muscles - 25 units into the left occipitalis muscle and 25 units into the right occipitalis muscle (3 injection sites per muscle) (25 units total)     Splenius Capitis muscles - 12.5 units into the left splenius capitis muscle and 12.5 units into the right splenius capitis muscle (2 injection sites per muscle, divided into 2/3 anteriorly and 1/3 posteriorly) (25 units total)                 Trapezius muscles - 25 units into the left trapezius muscle and 25 units into the right trapezius muscle (3 injection sites per muscle, divided 5 units, 10 units, 10 units, medial to lateral) (50 units total)    Ms. Varela tolerated the procedure well without immediate complications.  She will follow up in clinic for assessment of the effectiveness of treatment.  She did not report any change in her  pain level after the botulinumtoxinA injection procedure.    Abril Salazar MD  Headache Neurology  [facility]        Again, thank you for allowing me to participate in the care of your patient.        Sincerely,        Abril Salazar MD

## 2023-03-01 ENCOUNTER — LAB REQUISITION (OUTPATIENT)
Dept: LAB | Facility: CLINIC | Age: 28
End: 2023-03-01
Payer: COMMERCIAL

## 2023-03-01 DIAGNOSIS — R87.619 UNSPECIFIED ABNORMAL CYTOLOGICAL FINDINGS IN SPECIMENS FROM CERVIX UTERI: ICD-10-CM

## 2023-03-01 DIAGNOSIS — Z12.4 ENCOUNTER FOR SCREENING FOR MALIGNANT NEOPLASM OF CERVIX: ICD-10-CM

## 2023-03-01 PROCEDURE — 88141 CYTOPATH C/V INTERPRET: CPT | Performed by: PATHOLOGY

## 2023-03-01 PROCEDURE — 88305 TISSUE EXAM BY PATHOLOGIST: CPT | Mod: 26 | Performed by: PATHOLOGY

## 2023-03-01 PROCEDURE — 87624 HPV HI-RISK TYP POOLED RSLT: CPT | Mod: ORL | Performed by: OBSTETRICS & GYNECOLOGY

## 2023-03-01 PROCEDURE — G0145 SCR C/V CYTO,THINLAYER,RESCR: HCPCS | Mod: ORL | Performed by: OBSTETRICS & GYNECOLOGY

## 2023-03-01 PROCEDURE — 88305 TISSUE EXAM BY PATHOLOGIST: CPT | Mod: TC,ORL | Performed by: OBSTETRICS & GYNECOLOGY

## 2023-04-03 ENCOUNTER — OFFICE VISIT (OUTPATIENT)
Dept: NEUROLOGY | Facility: CLINIC | Age: 28
End: 2023-04-03
Payer: COMMERCIAL

## 2023-04-03 DIAGNOSIS — G43.009 MIGRAINE WITHOUT AURA AND WITHOUT STATUS MIGRAINOSUS, NOT INTRACTABLE: ICD-10-CM

## 2023-04-03 DIAGNOSIS — G43.709 CHRONIC MIGRAINE WITHOUT AURA WITHOUT STATUS MIGRAINOSUS, NOT INTRACTABLE: ICD-10-CM

## 2023-04-03 PROCEDURE — 64615 CHEMODENERV MUSC MIGRAINE: CPT | Performed by: PSYCHIATRY & NEUROLOGY

## 2023-04-03 RX ORDER — TOPIRAMATE 25 MG/1
TABLET, FILM COATED ORAL
Qty: 180 TABLET | Refills: 3 | Status: SHIPPED | OUTPATIENT
Start: 2023-04-03 | End: 2024-03-12

## 2023-04-03 ASSESSMENT — HEADACHE IMPACT TEST (HIT 6)
HOW OFTEN DID HEADACHS LIMIT CONCENTRATION ON WORK OR DAILY ACTIVITY: SOMETIMES
WHEN YOU HAVE HEADACHES HOW OFTEN IS THE PAIN SEVERE: SOMETIMES
HOW OFTEN DO HEADACHES LIMIT YOUR DAILY ACTIVITIES: SOMETIMES
HOW OFTEN HAVE YOU FELT TOO TIRED TO WORK BECAUSE OF YOUR HEADACHES: SOMETIMES
HOW OFTEN HAVE YOU FELT FED UP OR IRRITATED BECAUSE OF YOUR HEADACHES: SOMETIMES
HIT6 TOTAL SCORE: 61
WHEN YOU HAVE A HEADACHE HOW OFTEN DO YOU WISH YOU COULD LIE DOWN: VERY OFTEN

## 2023-04-03 NOTE — PROGRESS NOTES
[facility]  Botulinum Toxin Procedure    Abril Salazar MD  Headache Neurology    April 3, 2023    Procedure:  OnabotulinumtoxinA injections for chronic migraine  Indication:  Chronic migraine    Ms. Varela suffers from severe intractable headaches.  She was referred by Dr. Salazar for onabotulinumtoxinA injections for headache.  Risks, benefits, and alternatives were discussed.  All questions were answered and consent given.  She decided to proceed with the injections.      Prior to initiation of botulinum toxin injections, Ms. Varela reported 15-20 headache days per month, with 15-20 severe headache days per month. Her headaches are quite disabling and often interfere with her ability to function normally.     Ms. Varela reports 4 headache days per month currently, with 4 severe headache days per month.  She has noticed a wearing off phenomenon prior to this round of botulinum toxin injections, lasting 3-6 weeks.     Due to significant wearing off, we decided to increase dosing to 200 units, after trialing standard dosing.     She has attempted other migraine prophylactic treatments in the past, which have included:  topiramate, venlafaxine, and gabapentin.  She is also tried nortriptyline in the past which caused side effects and was stopped.  She currently takes Spironolactone for other reasons, and this is not been helpful for headache.  She has a history of dizziness, and I did not recommend a beta-blocker out of fear of worsening this.  She also takes co-Q10 supplements, which are not clearly effective.         She currently takes CoQ10, topiramate for headache prevention.     Ms. Varela's pain was assessed prior to the procedure.  She rated her pain today as 0 out of 10.    Procedural Pause: Procedural pause was conducted to verify correct patient identity, procedure to be performed, correct side and site, correct patient position, and special requirements. Appropriate hand hygiene was utilized, and each  injection site was prepped with alcohol wipes or Chloraprep swab.     Procedure Details: 200 units of onabotulinumtoxinA was diluted in 4 mL 0.9% normal saline. A total of 200 units of onabotulinumtoxinA were injected using 30 gauge 0.5 in needles into the muscles listed below. 0 units of onabotulinumtoxinA were wasted.      Injection Sites: Total = 200 units onabotulinumtoxinA       and Procerus muscles - 5 units into the left and right corrugators and 5 units into the procerus (15 units total)     Frontalis muscles - 5 units into the left superior frontalis and 5 units into the right superior frontalis (2 injection sites per muscle) (10 units total)     Temporalis muscles - 25 units into the left temporalis muscle and 25 units into the right temporalis muscle (3 injection sites per muscle) (25 units total)     Occipitalis muscles - 25 units into the left occipitalis muscle and 25 units into the right occipitalis muscle (3 injection sites per muscle) (25 units total)     Splenius Capitis muscles - 12.5 units into the left splenius capitis muscle and 12.5 units into the right splenius capitis muscle (2 injection sites per muscle, divided into 2/3 anteriorly and 1/3 posteriorly) (25 units total)                 Trapezius muscles - 25 units into the left trapezius muscle and 25 units into the right trapezius muscle (3 injection sites per muscle, divided 5 units, 10 units, 10 units, medial to lateral) (50 units total)    Ms. Varela tolerated the procedure well without immediate complications.  She will follow up in clinic for assessment of the effectiveness of treatment.  She did not report any change in her pain level after the botulinumtoxinA injection procedure.    Abril Salazar MD  Headache Neurology  [facility]

## 2023-04-03 NOTE — LETTER
4/3/2023       RE: Carito Varela  [address]  [address]  [facility] [address]     Dear Colleague,    Thank you for referring your patient, Carito Varela, to the [facility] NEUROLOGY CLINIC [place] at [facility]. Please see a copy of my visit note below.    [facility]  Botulinum Toxin Procedure    Abril Salazar MD  Headache Neurology    April 3, 2023    Procedure:  OnabotulinumtoxinA injections for chronic migraine  Indication:  Chronic migraine    Ms. Varela suffers from severe intractable headaches.  She was referred by Dr. Salazar for onabotulinumtoxinA injections for headache.  Risks, benefits, and alternatives were discussed.  All questions were answered and consent given.  She decided to proceed with the injections.      Prior to initiation of botulinum toxin injections, Ms. Varela reported 15-20 headache days per month, with 15-20 severe headache days per month. Her headaches are quite disabling and often interfere with her ability to function normally.     Ms. Varela reports 4 headache days per month currently, with 4 severe headache days per month.  She has noticed a wearing off phenomenon prior to this round of botulinum toxin injections, lasting 3-6 weeks.     Due to significant wearing off, we decided to increase dosing to 200 units, after trialing standard dosing.     She has attempted other migraine prophylactic treatments in the past, which have included:  topiramate, venlafaxine, and gabapentin.  She is also tried nortriptyline in the past which caused side effects and was stopped.  She currently takes Spironolactone for other reasons, and this is not been helpful for headache.  She has a history of dizziness, and I did not recommend a beta-blocker out of fear of worsening this.  She also takes co-Q10 supplements, which are not clearly effective.         She currently takes CoQ10, topiramate for headache prevention.     Ms. Varela's pain was  assessed prior to the procedure.  She rated her pain today as 0 out of 10.    Procedural Pause: Procedural pause was conducted to verify correct patient identity, procedure to be performed, correct side and site, correct patient position, and special requirements. Appropriate hand hygiene was utilized, and each injection site was prepped with alcohol wipes or Chloraprep swab.     Procedure Details: 200 units of onabotulinumtoxinA was diluted in 4 mL 0.9% normal saline. A total of 200 units of onabotulinumtoxinA were injected using 30 gauge 0.5 in needles into the muscles listed below. 0 units of onabotulinumtoxinA were wasted.      Injection Sites: Total = 200 units onabotulinumtoxinA       and Procerus muscles - 5 units into the left and right corrugators and 5 units into the procerus (15 units total)     Frontalis muscles - 5 units into the left superior frontalis and 5 units into the right superior frontalis (2 injection sites per muscle) (10 units total)     Temporalis muscles - 25 units into the left temporalis muscle and 25 units into the right temporalis muscle (3 injection sites per muscle) (25 units total)     Occipitalis muscles - 25 units into the left occipitalis muscle and 25 units into the right occipitalis muscle (3 injection sites per muscle) (25 units total)     Splenius Capitis muscles - 12.5 units into the left splenius capitis muscle and 12.5 units into the right splenius capitis muscle (2 injection sites per muscle, divided into 2/3 anteriorly and 1/3 posteriorly) (25 units total)                 Trapezius muscles - 25 units into the left trapezius muscle and 25 units into the right trapezius muscle (3 injection sites per muscle, divided 5 units, 10 units, 10 units, medial to lateral) (50 units total)    Ms. Varela tolerated the procedure well without immediate complications.  She will follow up in clinic for assessment of the effectiveness of treatment.  She did not report any change  in her pain level after the botulinumtoxinA injection procedure.    Abril Salazar MD  Headache Neurology  [facility]

## 2023-05-16 ENCOUNTER — LAB REQUISITION (OUTPATIENT)
Dept: LAB | Facility: CLINIC | Age: 28
End: 2023-05-16
Payer: COMMERCIAL

## 2023-05-16 DIAGNOSIS — R30.0 DYSURIA: ICD-10-CM

## 2023-05-16 PROCEDURE — 87086 URINE CULTURE/COLONY COUNT: CPT | Mod: ORL | Performed by: NURSE PRACTITIONER

## 2023-05-18 LAB — BACTERIA UR CULT: NORMAL

## 2023-05-27 ENCOUNTER — HEALTH MAINTENANCE LETTER (OUTPATIENT)
Age: 28
End: 2023-05-27

## 2023-06-26 ENCOUNTER — OFFICE VISIT (OUTPATIENT)
Dept: NEUROLOGY | Facility: CLINIC | Age: 28
End: 2023-06-26
Payer: COMMERCIAL

## 2023-06-26 DIAGNOSIS — G43.709 CHRONIC MIGRAINE WITHOUT AURA WITHOUT STATUS MIGRAINOSUS, NOT INTRACTABLE: Primary | ICD-10-CM

## 2023-06-26 PROCEDURE — 64615 CHEMODENERV MUSC MIGRAINE: CPT | Performed by: PSYCHIATRY & NEUROLOGY

## 2023-06-26 NOTE — LETTER
6/26/2023       RE: Carito Varela  [address]  [place] [address]     Dear Colleague,    Thank you for referring your patient, Carito Varela, to the [facility] NEUROLOGY CLINIC [place] at [facility]. Please see a copy of my visit note below.    [facility]  Botulinum Toxin Procedure    Abril Salazar MD  Headache Neurology    June 26, 2023    Procedure:  OnabotulinumtoxinA injections for chronic migraine  Indication:  Chronic migraine    Ms. Varela suffers from severe intractable headaches.  She was referred by Dr. Salazar for onabotulinumtoxinA injections for headache.  Risks, benefits, and alternatives were discussed.  All questions were answered and consent given.  She decided to proceed with the injections.      Prior to initiation of botulinum toxin injections, Ms. Varela reported 15-20 headache days per month, with 15-20 severe headache days per month. Her headaches are quite disabling and often interfere with her ability to function normally.     Ms. Varela reports 4 headache days per month currently, with 4 severe headache days per month.  She has not noticed a wearing off phenomenon prior to this round of botulinum toxin injections, lasting 0 weeks.      Due to significant wearing off, we decided to increase dosing to 200 units, after trialing standard dosing.     She has attempted other migraine prophylactic treatments in the past, which have included:  topiramate, venlafaxine, and gabapentin.  She is also tried nortriptyline in the past which caused side effects and was stopped.  She currently takes Spironolactone for other reasons, and this is not been helpful for headache.  She has a history of dizziness, and I did not recommend a beta-blocker out of fear of worsening this.  She also takes co-Q10 supplements, which are not clearly effective.         She currently takes CoQ10, topiramate for headache prevention.     Ms. Varela's  pain was assessed prior to the procedure.  She rated her pain today as 2 out of 10.    Procedural Pause: Procedural pause was conducted to verify correct patient identity, procedure to be performed, correct side and site, correct patient position, and special requirements. Appropriate hand hygiene was utilized, and each injection site was prepped with alcohol wipe or Chloraprep swab.     Procedure Details: 200 units of onabotulinumtoxinA was diluted in 4 mL 0.9% normal saline. A total of 200 units of onabotulinumtoxinA were injected using 30 gauge 0.5 in needles into the muscles listed below. 0 units of onabotulinumtoxinA were wasted.      Injection Sites: Total = 200 units onabotulinumtoxinA       and Procerus muscles - 5 units into the left and right corrugators and 5 units into the procerus (15 units total)     Frontalis muscles - 5 units into the left superior frontalis and 5 units into the right superior frontalis (2 injection sites per muscle) (10 units total)     Temporalis muscles - 25 units into the left temporalis muscle and 25 units into the right temporalis muscle (3 injection sites per muscle) (25 units total)     Occipitalis muscles - 25 units into the left occipitalis muscle and 25 units into the right occipitalis muscle (3 injection sites per muscle) (25 units total)     Splenius Capitis muscles - 12.5 units into the left splenius capitis muscle and 12.5 units into the right splenius capitis muscle (2 injection sites per muscle, divided into 2/3 anteriorly and 1/3 posteriorly) (25 units total)                 Trapezius muscles - 25 units into the left trapezius muscle and 25 units into the right trapezius muscle (3 injection sites per muscle, divided 5 units, 10 units, 10 units, medial to lateral) (50 units total)    Ms. Varela tolerated the procedure well without immediate complications.  She will follow up in clinic for assessment of the effectiveness of treatment.  She did not report any  change in her pain level after the botulinumtoxinA injection procedure.        Again, thank you for allowing me to participate in the care of your patient.      Sincerely,    Abril Salazar MD

## 2023-06-26 NOTE — PROGRESS NOTES
[facility]  Botulinum Toxin Procedure    Abril Salazar MD  Headache Neurology    June 26, 2023    Procedure:  OnabotulinumtoxinA injections for chronic migraine  Indication:  Chronic migraine    Ms. Varela suffers from severe intractable headaches.  She was referred by Dr. Salazar for onabotulinumtoxinA injections for headache.  Risks, benefits, and alternatives were discussed.  All questions were answered and consent given.  She decided to proceed with the injections.      Prior to initiation of botulinum toxin injections, Ms. Varela reported 15-20 headache days per month, with 15-20 severe headache days per month. Her headaches are quite disabling and often interfere with her ability to function normally.     Ms. Varela reports 4 headache days per month currently, with 4 severe headache days per month.  She has not noticed a wearing off phenomenon prior to this round of botulinum toxin injections, lasting 0 weeks.      Due to significant wearing off, we decided to increase dosing to 200 units, after trialing standard dosing.     She has attempted other migraine prophylactic treatments in the past, which have included:  topiramate, venlafaxine, and gabapentin.  She is also tried nortriptyline in the past which caused side effects and was stopped.  She currently takes Spironolactone for other reasons, and this is not been helpful for headache.  She has a history of dizziness, and I did not recommend a beta-blocker out of fear of worsening this.  She also takes co-Q10 supplements, which are not clearly effective.         She currently takes CoQ10, topiramate for headache prevention.     Ms. Varela's pain was assessed prior to the procedure.  She rated her pain today as 2 out of 10.    Procedural Pause: Procedural pause was conducted to verify correct patient identity, procedure to be performed, correct side and site, correct patient position, and special requirements. Appropriate hand hygiene was utilized, and  each injection site was prepped with alcohol wipe or Chloraprep swab.     Procedure Details: 200 units of onabotulinumtoxinA was diluted in 4 mL 0.9% normal saline. A total of 200 units of onabotulinumtoxinA were injected using 30 gauge 0.5 in needles into the muscles listed below. 0 units of onabotulinumtoxinA were wasted.      Injection Sites: Total = 200 units onabotulinumtoxinA       and Procerus muscles - 5 units into the left and right corrugators and 5 units into the procerus (15 units total)     Frontalis muscles - 5 units into the left superior frontalis and 5 units into the right superior frontalis (2 injection sites per muscle) (10 units total)     Temporalis muscles - 25 units into the left temporalis muscle and 25 units into the right temporalis muscle (3 injection sites per muscle) (25 units total)     Occipitalis muscles - 25 units into the left occipitalis muscle and 25 units into the right occipitalis muscle (3 injection sites per muscle) (25 units total)     Splenius Capitis muscles - 12.5 units into the left splenius capitis muscle and 12.5 units into the right splenius capitis muscle (2 injection sites per muscle, divided into 2/3 anteriorly and 1/3 posteriorly) (25 units total)                 Trapezius muscles - 25 units into the left trapezius muscle and 25 units into the right trapezius muscle (3 injection sites per muscle, divided 5 units, 10 units, 10 units, medial to lateral) (50 units total)    Ms. Varela tolerated the procedure well without immediate complications.  She will follow up in clinic for assessment of the effectiveness of treatment.  She did not report any change in her pain level after the botulinumtoxinA injection procedure.    Abril Salazar MD  Headache Neurology  [facility]

## 2023-06-28 ENCOUNTER — LAB REQUISITION (OUTPATIENT)
Dept: LAB | Facility: CLINIC | Age: 28
End: 2023-06-28
Payer: COMMERCIAL

## 2023-06-28 DIAGNOSIS — R39.9 UNSPECIFIED SYMPTOMS AND SIGNS INVOLVING THE GENITOURINARY SYSTEM: ICD-10-CM

## 2023-06-28 PROCEDURE — 87086 URINE CULTURE/COLONY COUNT: CPT | Mod: ORL | Performed by: PHYSICIAN ASSISTANT

## 2023-06-30 LAB — BACTERIA UR CULT: NO GROWTH

## 2023-09-07 DIAGNOSIS — G43.709 CHRONIC MIGRAINE WITHOUT AURA WITHOUT STATUS MIGRAINOSUS, NOT INTRACTABLE: Primary | ICD-10-CM

## 2023-09-25 ENCOUNTER — OFFICE VISIT (OUTPATIENT)
Dept: NEUROLOGY | Facility: CLINIC | Age: 28
End: 2023-09-25
Payer: COMMERCIAL

## 2023-09-25 DIAGNOSIS — G43.709 CHRONIC MIGRAINE WITHOUT AURA WITHOUT STATUS MIGRAINOSUS, NOT INTRACTABLE: Primary | ICD-10-CM

## 2023-09-25 PROCEDURE — 64615 CHEMODENERV MUSC MIGRAINE: CPT | Performed by: NURSE PRACTITIONER

## 2023-09-25 NOTE — LETTER
9/25/2023       RE: Carito Varela  [address] [address]     Dear Colleague,    Thank you for referring your patient, Carito Varela, to the [facility] NEUROLOGY CLINIC [place] at [facility]. Please see a copy of my visit note below.    PROCEDURE NOTE:    [facility]  Botulinum Toxin Procedure    Headache Neurology    September 25, 2023    Procedure:  OnabotulinumtoxinA injections for chronic migraine  Indication:  Chronic migraine    Carito suffers from severe intractable headaches.  She was referred by for onabotulinumtoxinA injections for headache.  Risks, benefits, and alternatives were discussed.  All questions were answered and consent given.  She decided to proceed with the injections.     Headache History -see Dr Salazar's note from 8/14/2019 for details.     Prior to initiation of botulinum toxin injections, Ms. Varela reported 15-20 headache days per month, with 15-20 severe headache days per month. Her headaches are quite disabling and often interfere with her ability to function normally.    Date of last injections: 6/26/2023    Ms. Varela reports 8 headache days per month currently, with 8 severe headache days per month.  She has noticed a wearing off phenomenon prior to this round of botulinum toxin injections, lasting 2 weeks.    Botulinum toxin injections have improved their functioning. Helps a ton, not missing work or social activities    She has attempted other migraine prophylactic treatments in the past, which have included: topiramate, nortriptyline, BBBs are not favorable due to dissiness, naratriptan    She currently takes naratriptan, topiramate for headache prevention.    Ms. Simss pain was assessed prior to the procedure.  She rated her pain today as 3 out of 10.    Procedural Pause: Procedural pause was conducted to verify correct patient identity, procedure to be performed, correct side and site,  correct patient position, and special requirements. Appropriate hand hygiene was utilized, and each injection site was prepped with alcohol wipe or Chloraprep swab.     Procedure Details: 200 units of onabotulinumtoxinA was diluted in 4 mL 0.9% normal saline. A total of200 units of onabotulinumtoxinA were injected using 30 gauge 0.5 in needles into the muscles listed below.   0 units of onabotulinumtoxinA were wasted.     GOAL OF PROCEDURE:  The goal of this procedure is to decrease pain and enhance functional independence.    CONSENT:  The risks, benefits, and treatment options were discussed with the patient who agreed to proceed.    Written consent was obtained     EQUIPMENT USED:  Needles-30 gauge, 0.5 inches for injections  Four 1-ml tuberculin syringes for injections  One sodium chloride 10 ml vial preservative free  Alcohol swabs    SKIN PREPARATION:  Skin preparation was performed using an alcohol wipe.    Injection Sites: Total = 200 units onabotulinumtoxinA       and Procerus muscles - 5 units into the left and right corrugators and 5 units into the procerus (15 units total)     Frontalis muscles - 5 units into the left superior frontalis and 5 units into the right superior frontalis (2 injection sites per muscle) (10 units total)     Temporalis muscles - 25 units into the left temporalis muscle and 25 units into the right temporalis muscle (3 injection sites per muscle) (25 units total)     Occipitalis muscles - 25 units into the left occipitalis muscle and 25 units into the right occipitalis muscle (3 injection sites per muscle) (25 units total)     Splenius Capitis muscles - 12.5 units into the left splenius capitis muscle and 12.5 units into the right splenius capitis muscle (2 injection sites per muscle, divided into 2/3 anteriorly and 1/3 posteriorly) (25 units total)                 Trapezius muscles - 25 units into the left trapezius muscle and 25 units into the right trapezius muscle (3  injection sites per muscle, divided 5 units, 10 units, 10 units, medial to lateral) (50 units total)      RESPONSE TO PROCEDURE:  tolerated the procedure well and there were no immediate complications.  Patient was allowed to recover for an appropriate period of time and was discharged home in stable condition.    FOLLOW UP:    Repeat Botox injections in 12 weeks      This procedure was performed under a hospital privileging agreement with Dr. Wen          Again, thank you for allowing me to participate in the care of your patient.      Sincerely,    ALFONZO Winslow CNP

## 2023-09-25 NOTE — PROGRESS NOTES
PROCEDURE NOTE:    [facility]  Botulinum Toxin Procedure    Headache Neurology    September 25, 2023    Procedure:  OnabotulinumtoxinA injections for chronic migraine  Indication:  Chronic migraine    Carito suffers from severe intractable headaches.  She was referred by for onabotulinumtoxinA injections for headache.  Risks, benefits, and alternatives were discussed.  All questions were answered and consent given.  She decided to proceed with the injections.     Headache History -see Dr Salazar's note from 8/14/2019 for details.     Prior to initiation of botulinum toxin injections, Ms. Varela reported 15-20 headache days per month, with 15-20 severe headache days per month. Her headaches are quite disabling and often interfere with her ability to function normally.    Date of last injections: 6/26/2023    Ms. Varela reports 8 headache days per month currently, with 8 severe headache days per month.  She has noticed a wearing off phenomenon prior to this round of botulinum toxin injections, lasting 2 weeks.    Botulinum toxin injections have improved their functioning. Helps a ton, not missing work or social activities    She has attempted other migraine prophylactic treatments in the past, which have included: topiramate, nortriptyline, BBBs are not favorable due to dissiness, naratriptan    She currently takes naratriptan, topiramate for headache prevention.    Ms. Simss pain was assessed prior to the procedure.  She rated her pain today as 3 out of 10.    Procedural Pause: Procedural pause was conducted to verify correct patient identity, procedure to be performed, correct side and site, correct patient position, and special requirements. Appropriate hand hygiene was utilized, and each injection site was prepped with alcohol wipe or Chloraprep swab.     Procedure Details: 200 units of onabotulinumtoxinA was diluted in 4 mL 0.9% normal saline. A total of200 units of onabotulinumtoxinA were injected using 30  gauge 0.5 in needles into the muscles listed below.   0 units of onabotulinumtoxinA were wasted.     GOAL OF PROCEDURE:  The goal of this procedure is to decrease pain and enhance functional independence.    CONSENT:  The risks, benefits, and treatment options were discussed with the patient who agreed to proceed.    Written consent was obtained     EQUIPMENT USED:  Needles-30 gauge, 0.5 inches for injections  Four 1-ml tuberculin syringes for injections  One sodium chloride 10 ml vial preservative free  Alcohol swabs    SKIN PREPARATION:  Skin preparation was performed using an alcohol wipe.    Injection Sites: Total = 200 units onabotulinumtoxinA       and Procerus muscles - 5 units into the left and right corrugators and 5 units into the procerus (15 units total)     Frontalis muscles - 5 units into the left superior frontalis and 5 units into the right superior frontalis (2 injection sites per muscle) (10 units total)     Temporalis muscles - 25 units into the left temporalis muscle and 25 units into the right temporalis muscle (3 injection sites per muscle) (25 units total)     Occipitalis muscles - 25 units into the left occipitalis muscle and 25 units into the right occipitalis muscle (3 injection sites per muscle) (25 units total)     Splenius Capitis muscles - 12.5 units into the left splenius capitis muscle and 12.5 units into the right splenius capitis muscle (2 injection sites per muscle, divided into 2/3 anteriorly and 1/3 posteriorly) (25 units total)                 Trapezius muscles - 25 units into the left trapezius muscle and 25 units into the right trapezius muscle (3 injection sites per muscle, divided 5 units, 10 units, 10 units, medial to lateral) (50 units total)      RESPONSE TO PROCEDURE:  tolerated the procedure well and there were no immediate complications.  Patient was allowed to recover for an appropriate period of time and was discharged home in stable condition.    FOLLOW  UP:    Repeat Botox injections in 12 weeks      This procedure was performed under a hospital privileging agreement with Dr. Behzad Bazan, ALFONZO, CNP  [facility]

## 2023-09-28 ENCOUNTER — LAB REQUISITION (OUTPATIENT)
Dept: LAB | Facility: CLINIC | Age: 28
End: 2023-09-28

## 2023-09-28 DIAGNOSIS — Z13.0 ENCOUNTER FOR SCREENING FOR DISEASES OF THE BLOOD AND BLOOD-FORMING ORGANS AND CERTAIN DISORDERS INVOLVING THE IMMUNE MECHANISM: ICD-10-CM

## 2023-09-28 DIAGNOSIS — Z13.29 ENCOUNTER FOR SCREENING FOR OTHER SUSPECTED ENDOCRINE DISORDER: ICD-10-CM

## 2023-09-28 DIAGNOSIS — Z13.21 ENCOUNTER FOR SCREENING FOR NUTRITIONAL DISORDER: ICD-10-CM

## 2023-09-28 DIAGNOSIS — Z13.220 ENCOUNTER FOR SCREENING FOR LIPOID DISORDERS: ICD-10-CM

## 2023-09-28 DIAGNOSIS — Z13.1 ENCOUNTER FOR SCREENING FOR DIABETES MELLITUS: ICD-10-CM

## 2023-09-28 LAB
ALBUMIN SERPL BCG-MCNC: 4.9 G/DL (ref 3.5–5.2)
ALP SERPL-CCNC: 59 U/L (ref 35–104)
ALT SERPL W P-5'-P-CCNC: 13 U/L (ref 0–50)
ANION GAP SERPL CALCULATED.3IONS-SCNC: 13 MMOL/L (ref 7–15)
AST SERPL W P-5'-P-CCNC: 23 U/L (ref 0–45)
BILIRUB SERPL-MCNC: 0.7 MG/DL
BUN SERPL-MCNC: 11.7 MG/DL (ref 6–20)
CALCIUM SERPL-MCNC: 10.1 MG/DL (ref 8.6–10)
CHLORIDE SERPL-SCNC: 104 MMOL/L (ref 98–107)
CHOLEST SERPL-MCNC: 214 MG/DL
CREAT SERPL-MCNC: 0.97 MG/DL (ref 0.51–0.95)
EGFRCR SERPLBLD CKD-EPI 2021: 81 ML/MIN/1.73M2
GLUCOSE SERPL-MCNC: 84 MG/DL (ref 70–99)
HCO3 SERPL-SCNC: 22 MMOL/L (ref 22–29)
HDLC SERPL-MCNC: 56 MG/DL
HGB BLD-MCNC: 15.8 G/DL (ref 11.7–15.7)
HOLD SPECIMEN: NORMAL
HOLD SPECIMEN: NORMAL
LDLC SERPL CALC-MCNC: 135 MG/DL
NONHDLC SERPL-MCNC: 158 MG/DL
POTASSIUM SERPL-SCNC: 3.8 MMOL/L (ref 3.4–5.3)
PROT SERPL-MCNC: 7.7 G/DL (ref 6.4–8.3)
SODIUM SERPL-SCNC: 139 MMOL/L (ref 135–145)
T4 FREE SERPL-MCNC: 1.31 NG/DL (ref 0.9–1.7)
TRIGL SERPL-MCNC: 117 MG/DL
TSH SERPL DL<=0.005 MIU/L-ACNC: 4.59 UIU/ML (ref 0.3–4.2)
VIT D+METAB SERPL-MCNC: 53 NG/ML (ref 20–50)

## 2023-09-28 PROCEDURE — 80061 LIPID PANEL: CPT | Performed by: OBSTETRICS & GYNECOLOGY

## 2023-09-28 PROCEDURE — 85018 HEMOGLOBIN: CPT | Performed by: OBSTETRICS & GYNECOLOGY

## 2023-09-28 PROCEDURE — 84439 ASSAY OF FREE THYROXINE: CPT | Performed by: OBSTETRICS & GYNECOLOGY

## 2023-09-28 PROCEDURE — 84443 ASSAY THYROID STIM HORMONE: CPT | Performed by: OBSTETRICS & GYNECOLOGY

## 2023-09-28 PROCEDURE — 82306 VITAMIN D 25 HYDROXY: CPT | Performed by: OBSTETRICS & GYNECOLOGY

## 2023-09-28 PROCEDURE — 80053 COMPREHEN METABOLIC PANEL: CPT | Performed by: OBSTETRICS & GYNECOLOGY

## 2023-12-13 ENCOUNTER — TELEPHONE (OUTPATIENT)
Dept: NEUROLOGY | Facility: CLINIC | Age: 28
End: 2023-12-13
Payer: COMMERCIAL

## 2023-12-13 NOTE — TELEPHONE ENCOUNTER
[facility] Call Center    Phone Message    May a detailed message be left on voicemail: yes     Reason for Call: Patient requests a call back concerning insurance coverage for her upcoming Botox appointment on Monday, Dec, 18th        Action Taken: Other: [facility]    Travel Screening: Not Applicable

## 2023-12-13 NOTE — TELEPHONE ENCOUNTER
LVM stating no PA is required for botox appt on 12/18/23.    Per referral/CAM team comments:  12.18.23 / [facility] NEURO / BOTOX () - No PA required - per policy - G43.709 is covered - 200 units every 84 days - emailed MATTI to pt on 01.08.23. for copay assistance     Advised to call back if further questions or concerns.

## 2023-12-18 ENCOUNTER — OFFICE VISIT (OUTPATIENT)
Dept: NEUROLOGY | Facility: CLINIC | Age: 28
End: 2023-12-18
Payer: COMMERCIAL

## 2023-12-18 DIAGNOSIS — G43.709 CHRONIC MIGRAINE WITHOUT AURA WITHOUT STATUS MIGRAINOSUS, NOT INTRACTABLE: Primary | ICD-10-CM

## 2023-12-18 PROCEDURE — 64615 CHEMODENERV MUSC MIGRAINE: CPT | Performed by: PSYCHIATRY & NEUROLOGY

## 2023-12-18 NOTE — LETTER
12/18/2023       RE: Carito Varela  [address] [address]       Dear Colleague,    Thank you for referring your patient, Carito Varela, to the [facility] NEUROLOGY CLINIC [place] at [facility]. Please see a copy of my visit note below.    [facility]  Botulinum Toxin Procedure      December 18, 2023    Procedure:  OnabotulinumtoxinA injections for chronic migraine  Indication:  Chronic migraine    Ms. Varela suffers from severe intractable headaches.  She was referred by Dr. Salazar for onabotulinumtoxinA injections for headache.  Risks, benefits, and alternatives were discussed.  All questions were answered and consent given.  She decided to proceed with the injections.      Prior to initiation of botulinum toxin injections, Ms. Varela reported 15-20 headache days per month, with 15-20 severe headache days per month. Her headaches are quite disabling and often interfere with her ability to function normally.    Last set of injections: 9/25/2023     Ms. Varela reports 8 headache days per month currently, with 8 severe headache days per month.  She has not noticed a wearing off phenomenon prior to this round of botulinum toxin injections, lasting 2 weeks.      Due to significant wearing off, we decided to increase dosing to 200 units, after trialing standard dosing.     She has attempted other migraine prophylactic treatments in the past, which have included: nortriptyline, topiramate, venlafaxine, and gabapentin.      -She currently takes Spironolactone for other reasons, and this is not been helpful for headache.    -She has a history of dizziness, and I did not recommend a beta-blocker out of concern of worsening this.    -She also takes co-Q10 supplements, which are not clearly effective.         She currently takes CoQ10, topiramate for headache prevention.     Ms. Varela's pain was assessed prior to the procedure.  She rated her  pain today as 5 out of 10.    Procedural Pause: Procedural pause was conducted to verify correct patient identity, procedure to be performed, correct side and site, correct patient position, and special requirements. Appropriate hand hygiene was utilized, and each injection site was prepped with alcohol wipe or Chloraprep swab.     Procedure Details: 200 units of onabotulinumtoxinA was diluted in 4 mL 0.9% normal saline. A total of 200 units of onabotulinumtoxinA were injected using 30 gauge 0.5 in needles into the muscles listed below. 0 units of onabotulinumtoxinA were wasted.      Injection Sites: Total = 200 units onabotulinumtoxinA       and Procerus muscles - 5 units into the left and right corrugators and 5 units into the procerus (15 units total)     Frontalis muscles - 5 units into the left superior frontalis and 5 units into the right superior frontalis (2 injection sites per muscle) (10 units total)     Temporalis muscles - 25 units into the left temporalis muscle and 25 units into the right temporalis muscle (3 injection sites per muscle) (25 units total)     Occipitalis muscles - 25 units into the left occipitalis muscle and 25 units into the right occipitalis muscle (3 injection sites per muscle) (25 units total)     Splenius Capitis muscles - 12.5 units into the left splenius capitis muscle and 12.5 units into the right splenius capitis muscle (2 injection sites per muscle, divided into 2/3 anteriorly and 1/3 posteriorly) (25 units total)                 Trapezius muscles - 25 units into the left trapezius muscle and 25 units into the right trapezius muscle (3 injection sites per muscle, divided 5 units, 10 units, 10 units, medial to lateral) (50 units total)    Ms. Varela tolerated the procedure well without immediate complications.  She will follow up in clinic for assessment of the effectiveness of treatment.  She did not report any change in her pain level after the botulinumtoxinA  injection procedure.        Again, thank you for allowing me to participate in the care of your patient.      Sincerely,    Abril Salazar MD

## 2023-12-18 NOTE — PROGRESS NOTES
[facility]  Botulinum Toxin Procedure    Abril Salazar MD  Headache Neurology    December 18, 2023    Procedure:  OnabotulinumtoxinA injections for chronic migraine  Indication:  Chronic migraine    Ms. Varela suffers from severe intractable headaches.  She was referred by Dr. Salazar for onabotulinumtoxinA injections for headache.  Risks, benefits, and alternatives were discussed.  All questions were answered and consent given.  She decided to proceed with the injections.      Prior to initiation of botulinum toxin injections, Ms. Varela reported 15-20 headache days per month, with 15-20 severe headache days per month. Her headaches are quite disabling and often interfere with her ability to function normally.    Last set of injections: 9/25/2023     Ms. Varela reports 8 headache days per month currently, with 8 severe headache days per month.  She has not noticed a wearing off phenomenon prior to this round of botulinum toxin injections, lasting 2 weeks.      Due to significant wearing off, we decided to increase dosing to 200 units, after trialing standard dosing.     She has attempted other migraine prophylactic treatments in the past, which have included: nortriptyline, topiramate, venlafaxine, and gabapentin.      -She currently takes Spironolactone for other reasons, and this is not been helpful for headache.    -She has a history of dizziness, and I did not recommend a beta-blocker out of concern of worsening this.    -She also takes co-Q10 supplements, which are not clearly effective.         She currently takes CoQ10, topiramate for headache prevention.     Ms. Varela's pain was assessed prior to the procedure.  She rated her pain today as 5 out of 10.    Procedural Pause: Procedural pause was conducted to verify correct patient identity, procedure to be performed, correct side and site, correct patient position, and special requirements. Appropriate hand hygiene was utilized, and each injection site  was prepped with alcohol wipe or Chloraprep swab.     Procedure Details: 200 units of onabotulinumtoxinA was diluted in 4 mL 0.9% normal saline. A total of 200 units of onabotulinumtoxinA were injected using 30 gauge 0.5 in needles into the muscles listed below. 0 units of onabotulinumtoxinA were wasted.      Injection Sites: Total = 200 units onabotulinumtoxinA       and Procerus muscles - 5 units into the left and right corrugators and 5 units into the procerus (15 units total)     Frontalis muscles - 5 units into the left superior frontalis and 5 units into the right superior frontalis (2 injection sites per muscle) (10 units total)     Temporalis muscles - 25 units into the left temporalis muscle and 25 units into the right temporalis muscle (3 injection sites per muscle) (25 units total)     Occipitalis muscles - 25 units into the left occipitalis muscle and 25 units into the right occipitalis muscle (3 injection sites per muscle) (25 units total)     Splenius Capitis muscles - 12.5 units into the left splenius capitis muscle and 12.5 units into the right splenius capitis muscle (2 injection sites per muscle, divided into 2/3 anteriorly and 1/3 posteriorly) (25 units total)                 Trapezius muscles - 25 units into the left trapezius muscle and 25 units into the right trapezius muscle (3 injection sites per muscle, divided 5 units, 10 units, 10 units, medial to lateral) (50 units total)    Ms. Varela tolerated the procedure well without immediate complications.  She will follow up in clinic for assessment of the effectiveness of treatment.  She did not report any change in her pain level after the botulinumtoxinA injection procedure.    Abril Salazar MD  Headache Neurology  [facility]

## 2024-03-12 ENCOUNTER — LAB REQUISITION (OUTPATIENT)
Dept: LAB | Facility: CLINIC | Age: 29
End: 2024-03-12

## 2024-03-12 ENCOUNTER — OFFICE VISIT (OUTPATIENT)
Dept: NEUROLOGY | Facility: CLINIC | Age: 29
End: 2024-03-12
Payer: COMMERCIAL

## 2024-03-12 DIAGNOSIS — R06.00 DYSPNEA, UNSPECIFIED: ICD-10-CM

## 2024-03-12 DIAGNOSIS — G43.719 INTRACTABLE CHRONIC MIGRAINE WITHOUT AURA AND WITHOUT STATUS MIGRAINOSUS: Primary | ICD-10-CM

## 2024-03-12 LAB
ERYTHROCYTE [SEDIMENTATION RATE] IN BLOOD BY WESTERGREN METHOD: 6 MM/HR (ref 0–20)
HOLD SPECIMEN: NORMAL

## 2024-03-12 PROCEDURE — 84443 ASSAY THYROID STIM HORMONE: CPT | Performed by: OBSTETRICS & GYNECOLOGY

## 2024-03-12 PROCEDURE — 85652 RBC SED RATE AUTOMATED: CPT | Performed by: OBSTETRICS & GYNECOLOGY

## 2024-03-12 PROCEDURE — 86140 C-REACTIVE PROTEIN: CPT | Performed by: OBSTETRICS & GYNECOLOGY

## 2024-03-12 PROCEDURE — 82310 ASSAY OF CALCIUM: CPT | Performed by: OBSTETRICS & GYNECOLOGY

## 2024-03-12 PROCEDURE — 84439 ASSAY OF FREE THYROXINE: CPT | Performed by: OBSTETRICS & GYNECOLOGY

## 2024-03-12 PROCEDURE — 82565 ASSAY OF CREATININE: CPT | Performed by: OBSTETRICS & GYNECOLOGY

## 2024-03-12 PROCEDURE — 64615 CHEMODENERV MUSC MIGRAINE: CPT | Performed by: PSYCHIATRY & NEUROLOGY

## 2024-03-12 PROCEDURE — 85379 FIBRIN DEGRADATION QUANT: CPT | Performed by: OBSTETRICS & GYNECOLOGY

## 2024-03-12 PROCEDURE — 86038 ANTINUCLEAR ANTIBODIES: CPT | Performed by: OBSTETRICS & GYNECOLOGY

## 2024-03-12 RX ORDER — TOPIRAMATE 25 MG/1
50 TABLET, FILM COATED ORAL AT BEDTIME
Qty: 180 TABLET | Refills: 3 | Status: SHIPPED | OUTPATIENT
Start: 2024-03-12

## 2024-03-12 NOTE — PROGRESS NOTES
Botulinum Toxin Procedure Note    Carito Varela  [id number]    Ordering provider: Abril Salazar MD    The procedure was explained to the patient. Benefits of the treatment were discussed including headache and migraine reduction. Risks of the procedure were reviewed including but not limited to pain, bruising, bleeding, infection, weakness of muscles injected or those distal to injection. The patient voiced understanding of the risks and benefits. All questions answered and the patient consented to proceed.     Prior to receiving Botox injections the patient reported the following:  Baseline headache/migraine frequency: 15-20 headaches days a month, all severe  Baseline headache/migraine duration:  Baseline MIDAS score:  Associated migrainous features:    Previous treatment trials: nortriptyline, topiramate, venlafaxine, and gabapentin.       Last Botox procedure: 12/18/23  Current migraine frequency:  one a week, worse as she ends the cycle. One week of one a day    Functional improvements since starting botulinum toxin treatments: less missed work and social events.       A 200 unit vial of Botox was diluted with 4ml of 0.9% sodium chloride    Botox lot # and expiration date: See MAR for details  0.9% sodium chloride lot # and expiration date: See MAR for details    The following muscles were injected using a 30 gauge needle:  Injection Sites: Total = 200 units onabotulinumtoxinA       and Procerus muscles - 5 units into the left and right corrugators and 5 units into the procerus (15 units total)     Frontalis muscles - 5 units into the left superior frontalis and 5 units into the right superior frontalis (2 injection sites per muscle) (10 units total)     Temporalis muscles - 25 units into the left temporalis muscle and 25 units into the right temporalis muscle (3 injection sites per muscle) (50 units total)     Occipitalis muscles - 20 units into the left occipitalis muscle and 20 units into the  right occipitalis muscle (3 injection sites per muscle) ( 40 units total)    Splenius Capitis muscles - 12.5 units into the left splenius capitis muscle and 12.5 units into the right splenius capitis muscle (2 injection sites per muscle, divided into 2/3 anteriorly and 1/3 posteriorly) (25 units total)     Trapezius muscles - 25 units into the left trapezius muscle and 25 units into the right trapezius muscle (3 injection sites per muscle, divided 5 units, 10 units, 10 units, medial to lateral) (50 units total)    Masseter 10 units 5 on either side      The patient tolerated the injections well. I was present for and performed the entire procedure.     Elisa Wen MD

## 2024-03-12 NOTE — LETTER
3/12/2024       RE: Carito Varela  [address] [address]     Dear Colleague,    Thank you for referring your patient, Carito Varela, to the [facility]UROLOGY [facility] [place] at [facility]. Please see a copy of my visit note below.        Botulinum Toxin Procedure Note    Carito Varela  [id number]    Ordering provider: Abril Salazar MD    The procedure was explained to the patient. Benefits of the treatment were discussed including headache and migraine reduction. Risks of the procedure were reviewed including but not limited to pain, bruising, bleeding, infection, weakness of muscles injected or those distal to injection. The patient voiced understanding of the risks and benefits. All questions answered and the patient consented to proceed.     Prior to receiving Botox injections the patient reported the following:  Baseline headache/migraine frequency: 15-20 headaches days a month, all severe  Baseline headache/migraine duration:  Baseline MIDAS score:  Associated migrainous features:    Previous treatment trials: nortriptyline, topiramate, venlafaxine, and gabapentin.       Last Botox procedure: 12/18/23  Current migraine frequency:  one a week, worse as she ends the cycle. One week of one a day    Functional improvements since starting botulinum toxin treatments: less missed work and social events.       A 200 unit vial of Botox was diluted with 4ml of 0.9% sodium chloride    Botox lot # and expiration date: See MAR for details  0.9% sodium chloride lot # and expiration date: See MAR for details    The following muscles were injected using a 30 gauge needle:  Injection Sites: Total = 200 units onabotulinumtoxinA       and Procerus muscles - 5 units into the left and right corrugators and 5 units into the procerus (15 units total)     Frontalis muscles - 5 units into the left superior frontalis and 5 units into the right  superior frontalis (2 injection sites per muscle) (10 units total)     Temporalis muscles - 25 units into the left temporalis muscle and 25 units into the right temporalis muscle (3 injection sites per muscle) (50 units total)     Occipitalis muscles - 20 units into the left occipitalis muscle and 20 units into the right occipitalis muscle (3 injection sites per muscle) ( 40 units total)    Splenius Capitis muscles - 12.5 units into the left splenius capitis muscle and 12.5 units into the right splenius capitis muscle (2 injection sites per muscle, divided into 2/3 anteriorly and 1/3 posteriorly) (25 units total)     Trapezius muscles - 25 units into the left trapezius muscle and 25 units into the right trapezius muscle (3 injection sites per muscle, divided 5 units, 10 units, 10 units, medial to lateral) (50 units total)    Masseter 10 units 5 on either side      The patient tolerated the injections well. I was present for and performed the entire procedure.         Again, thank you for allowing me to participate in the care of your patient.      Sincerely,    Elisa Wen MD

## 2024-03-13 LAB
ANA PAT SER IF-IMP: ABNORMAL
ANA SER QL IF: ABNORMAL
ANA TITR SER IF: ABNORMAL {TITER}
CALCIUM SERPL-MCNC: 9.6 MG/DL (ref 8.6–10)
CREAT SERPL-MCNC: 0.94 MG/DL (ref 0.51–0.95)
CRP SERPL-MCNC: <3 MG/L
D DIMER PPP FEU-MCNC: 0.73 UG/ML FEU (ref 0–0.5)
EGFRCR SERPLBLD CKD-EPI 2021: 84 ML/MIN/1.73M2
T4 FREE SERPL-MCNC: 0.84 NG/DL (ref 0.9–1.7)
TSH SERPL DL<=0.005 MIU/L-ACNC: 5.54 UIU/ML (ref 0.3–4.2)

## 2024-06-10 ENCOUNTER — OFFICE VISIT (OUTPATIENT)
Dept: NEUROLOGY | Facility: CLINIC | Age: 29
End: 2024-06-10
Payer: COMMERCIAL

## 2024-06-10 DIAGNOSIS — G43.719 INTRACTABLE CHRONIC MIGRAINE WITHOUT AURA AND WITHOUT STATUS MIGRAINOSUS: Primary | ICD-10-CM

## 2024-06-10 PROCEDURE — 64615 CHEMODENERV MUSC MIGRAINE: CPT | Performed by: PSYCHIATRY & NEUROLOGY

## 2024-06-10 NOTE — PROGRESS NOTES
[facility]  Botulinum Toxin Procedure    Abril Salazar MD  Headache Neurology    Maryam 10, 2024    Procedure:  OnabotulinumtoxinA injections for chronic migraine  Indication:  Chronic migraine    Ordering provider: Abril Salazar MD     The procedure was explained to the patient. Benefits of the treatment were discussed including headache and migraine reduction. Risks of the procedure were reviewed including but not limited to pain, bruising, bleeding, infection, weakness of muscles injected or those distal to injection. The patient voiced understanding of the risks and benefits. All questions answered and the patient consented to proceed.      Prior to receiving Botox injections the patient reported the following:  Baseline headache/migraine frequency: 15-20 headaches days a month, all severe     Previous treatment trials: nortriptyline, topiramate, venlafaxine, and gabapentin.     -She currently takes Spironolactone for other reasons, and this is not been helpful for headache.    -She has a history of dizziness, and I did not recommend a beta-blocker out of concern of worsening this.    -She also takes co-Q10 supplements, which are not clearly effective.      Last Botox procedure: 3/12/2024  Current migraine frequency:  one a week, worse as she ends the cycle. One week of one a day     Functional improvements since starting botulinum toxin treatments: less missed work and social events.      Procedural Pause: Procedural pause was conducted to verify correct patient identity, procedure to be performed, correct side and site, correct patient position, and special requirements. Appropriate hand hygiene was utilized, and each injection site was prepped with alcohol wipe or Chloraprep swab.     The following muscles were injected using a 30 gauge needle:  Injection Sites: Total = 200 units onabotulinumtoxinA       and Procerus muscles - 5 units into the left and right corrugators and 5 units into the  procerus (15 units total)     Frontalis muscles - 5 units into the left superior frontalis and 5 units into the right superior frontalis (2 injection sites per muscle) (10 units total)     Temporalis muscles - 25 units into the left temporalis muscle and 25 units into the right temporalis muscle (3 injection sites per muscle) (50 units total)     Occipitalis muscles - 20 units into the left occipitalis muscle and 20 units into the right occipitalis muscle (3 injection sites per muscle) ( 40 units total)     Splenius Capitis muscles - 12.5 units into the left splenius capitis muscle and 12.5 units into the right splenius capitis muscle (2 injection sites per muscle, divided into 2/3 anteriorly and 1/3 posteriorly) (25 units total)     Trapezius muscles - 25 units into the left trapezius muscle and 25 units into the right trapezius muscle (3 injection sites per muscle, divided 5 units, 10 units, 10 units, medial to lateral) (50 units total)     Masseter muscles -  5 units into the left and right masseter muscles (10 units total)    Ms. Varela tolerated the procedure well without immediate complications.  She did not report any change in her pain level after the botulinumtoxinA injection procedure.    Abril Salazar MD  Headache Neurology  [facility]

## 2024-06-10 NOTE — LETTER
6/10/2024       RE: Carito Varela  [address]  [place] [address]         Dear Colleague,    Thank you for referring your patient, Carito Varela, to the [facility]UROLOG[facility]. Please see a copy of my visit note below.      Maryam 10, 2024    Procedure:  OnabotulinumtoxinA injections for chronic migraine  Indication:  Chronic migraine    Ordering provider: Abril Salazar MD     The procedure was explained to the patient. Benefits of the treatment were discussed including headache and migraine reduction. Risks of the procedure were reviewed including but not limited to pain, bruising, bleeding, infection, weakness of muscles injected or those distal to injection. The patient voiced understanding of the risks and benefits. All questions answered and the patient consented to proceed.      Prior to receiving Botox injections the patient reported the following:  Baseline headache/migraine frequency: 15-20 headaches days a month, all severe     Previous treatment trials: nortriptyline, topiramate, venlafaxine, and gabapentin.     -She currently takes Spironolactone for other reasons, and this is not been helpful for headache.    -She has a history of dizziness, and I did not recommend a beta-blocker out of concern of worsening this.    -She also takes co-Q10 supplements, which are not clearly effective.      Last Botox procedure: 3/12/2024  Current migraine frequency:  one a week, worse as she ends the cycle. One week of one a day     Functional improvements since starting botulinum toxin treatments: less missed work and social events.      Procedural Pause: Procedural pause was conducted to verify correct patient identity, procedure to be performed, correct side and site, correct patient position, and special requirements. Appropriate hand hygiene was utilized, and each injection site was prepped with alcohol wipe or  Chloraprep swab.     The following muscles were injected using a 30 gauge needle:  Injection Sites: Total = 200 units onabotulinumtoxinA       and Procerus muscles - 5 units into the left and right corrugators and 5 units into the procerus (15 units total)     Frontalis muscles - 5 units into the left superior frontalis and 5 units into the right superior frontalis (2 injection sites per muscle) (10 units total)     Temporalis muscles - 25 units into the left temporalis muscle and 25 units into the right temporalis muscle (3 injection sites per muscle) (50 units total)     Occipitalis muscles - 20 units into the left occipitalis muscle and 20 units into the right occipitalis muscle (3 injection sites per muscle) ( 40 units total)     Splenius Capitis muscles - 12.5 units into the left splenius capitis muscle and 12.5 units into the right splenius capitis muscle (2 injection sites per muscle, divided into 2/3 anteriorly and 1/3 posteriorly) (25 units total)     Trapezius muscles - 25 units into the left trapezius muscle and 25 units into the right trapezius muscle (3 injection sites per muscle, divided 5 units, 10 units, 10 units, medial to lateral) (50 units total)     Masseter muscles -  5 units into the left and right masseter muscles (10 units total)    Ms. Varela tolerated the procedure well without immediate complications.  She did not report any change in her pain level after the botulinumtoxinA injection procedure.        Again, thank you for allowing me to participate in the care of your patient.      Sincerely,    Abril Salazar MD

## 2024-08-04 ENCOUNTER — HEALTH MAINTENANCE LETTER (OUTPATIENT)
Age: 29
End: 2024-08-04

## 2025-08-16 ENCOUNTER — HEALTH MAINTENANCE LETTER (OUTPATIENT)
Age: 30
End: 2025-08-16